# Patient Record
Sex: MALE | Race: WHITE | Employment: FULL TIME | URBAN - METROPOLITAN AREA
[De-identification: names, ages, dates, MRNs, and addresses within clinical notes are randomized per-mention and may not be internally consistent; named-entity substitution may affect disease eponyms.]

---

## 2022-04-11 ENCOUNTER — APPOINTMENT (OUTPATIENT)
Dept: GENERAL RADIOLOGY | Age: 32
DRG: 563 | End: 2022-04-11
Payer: COMMERCIAL

## 2022-04-11 ENCOUNTER — APPOINTMENT (OUTPATIENT)
Dept: CT IMAGING | Age: 32
DRG: 563 | End: 2022-04-11
Payer: COMMERCIAL

## 2022-04-11 ENCOUNTER — HOSPITAL ENCOUNTER (INPATIENT)
Age: 32
LOS: 1 days | Discharge: HOME | DRG: 563 | End: 2022-04-11
Attending: EMERGENCY MEDICINE | Admitting: SURGERY
Payer: COMMERCIAL

## 2022-04-11 VITALS
TEMPERATURE: 98.6 F | DIASTOLIC BLOOD PRESSURE: 110 MMHG | HEIGHT: 73 IN | HEART RATE: 90 BPM | OXYGEN SATURATION: 99 % | RESPIRATION RATE: 21 BRPM | SYSTOLIC BLOOD PRESSURE: 163 MMHG | WEIGHT: 195 LBS | BODY MASS INDEX: 25.84 KG/M2

## 2022-04-11 DIAGNOSIS — M25.571 ACUTE RIGHT ANKLE PAIN: ICD-10-CM
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
## 2022-04-11 LAB
ABO/RH: NORMAL
ANION GAP SERPL CALCULATED.3IONS-SCNC: 14 MMOL/L (ref 9–17)
ANTIBODY SCREEN: NEGATIVE
ARM BAND NUMBER: NORMAL
BLOOD BANK SPECIMEN: ABNORMAL
BUN BLDV-MCNC: 9 MG/DL (ref 6–20)
CARBOXYHEMOGLOBIN: 2.1 % (ref 0–5)
CHLORIDE BLD-SCNC: 105 MMOL/L (ref 98–107)
CO2: 19 MMOL/L (ref 20–31)
CREAT SERPL-MCNC: 0.85 MG/DL (ref 0.7–1.2)
ETHANOL PERCENT: <0.01 %
ETHANOL: <10 MG/DL
EXPIRATION DATE: NORMAL
FIO2: ABNORMAL
GLUCOSE BLD-MCNC: 89 MG/DL (ref 70–99)
HCG QUALITATIVE: ABNORMAL
HCO3 VENOUS: 19.7 MMOL/L (ref 24–30)
HCT VFR BLD CALC: 40.4 % (ref 40.7–50.3)
HEMOGLOBIN: 13.3 G/DL (ref 13–17)
INR BLD: 1
MCH RBC QN AUTO: 27.3 PG (ref 25.2–33.5)
MCHC RBC AUTO-ENTMCNC: 32.9 G/DL (ref 28.4–34.8)
MCV RBC AUTO: 82.8 FL (ref 82.6–102.9)
NEGATIVE BASE EXCESS, VEN: 3.2 MMOL/L (ref 0–2)
NRBC AUTOMATED: 0 PER 100 WBC
O2 SAT, VEN: 76.4 % (ref 60–85)
PARTIAL THROMBOPLASTIN TIME: 23.5 SEC (ref 20.5–30.5)
PATIENT TEMP: 37
PCO2, VEN: 30.6 (ref 39–55)
PDW BLD-RTO: 13.7 % (ref 11.8–14.4)
PH VENOUS: 7.42 (ref 7.32–7.42)
PLATELET # BLD: 255 K/UL (ref 138–453)
PMV BLD AUTO: 11.5 FL (ref 8.1–13.5)
PO2, VEN: 41.5 (ref 30–50)
POTASSIUM SERPL-SCNC: 2.9 MMOL/L (ref 3.7–5.3)
PROTHROMBIN TIME: 11.1 SEC (ref 9.1–12.3)
RBC # BLD: 4.88 M/UL (ref 4.21–5.77)
SARS-COV-2, RAPID: NOT DETECTED
SODIUM BLD-SCNC: 138 MMOL/L (ref 135–144)
SPECIMEN DESCRIPTION: NORMAL
WBC # BLD: 15.7 K/UL (ref 3.5–11.3)

## 2022-04-11 PROCEDURE — 73600 X-RAY EXAM OF ANKLE: CPT

## 2022-04-11 PROCEDURE — 1200000000 HC SEMI PRIVATE

## 2022-04-11 PROCEDURE — 85610 PROTHROMBIN TIME: CPT

## 2022-04-11 PROCEDURE — 86850 RBC ANTIBODY SCREEN: CPT

## 2022-04-11 PROCEDURE — G0480 DRUG TEST DEF 1-7 CLASSES: HCPCS

## 2022-04-11 PROCEDURE — 6360000002 HC RX W HCPCS

## 2022-04-11 PROCEDURE — 73700 CT LOWER EXTREMITY W/O DYE: CPT

## 2022-04-11 PROCEDURE — 86901 BLOOD TYPING SEROLOGIC RH(D): CPT

## 2022-04-11 PROCEDURE — 82805 BLOOD GASES W/O2 SATURATION: CPT

## 2022-04-11 PROCEDURE — 85027 COMPLETE CBC AUTOMATED: CPT

## 2022-04-11 PROCEDURE — 85730 THROMBOPLASTIN TIME PARTIAL: CPT

## 2022-04-11 PROCEDURE — 73610 X-RAY EXAM OF ANKLE: CPT

## 2022-04-11 PROCEDURE — 82947 ASSAY GLUCOSE BLOOD QUANT: CPT

## 2022-04-11 PROCEDURE — 73630 X-RAY EXAM OF FOOT: CPT

## 2022-04-11 PROCEDURE — 99283 EMERGENCY DEPT VISIT LOW MDM: CPT

## 2022-04-11 PROCEDURE — 86900 BLOOD TYPING SEROLOGIC ABO: CPT

## 2022-04-11 PROCEDURE — 87635 SARS-COV-2 COVID-19 AMP PRB: CPT

## 2022-04-11 PROCEDURE — 82565 ASSAY OF CREATININE: CPT

## 2022-04-11 PROCEDURE — 80051 ELECTROLYTE PANEL: CPT

## 2022-04-11 PROCEDURE — 84703 CHORIONIC GONADOTROPIN ASSAY: CPT

## 2022-04-11 PROCEDURE — 96374 THER/PROPH/DIAG INJ IV PUSH: CPT

## 2022-04-11 PROCEDURE — 84520 ASSAY OF UREA NITROGEN: CPT

## 2022-04-11 PROCEDURE — 73590 X-RAY EXAM OF LOWER LEG: CPT

## 2022-04-11 RX ORDER — SODIUM CHLORIDE 9 MG/ML
INJECTION, SOLUTION INTRAVENOUS PRN
Status: CANCELLED | OUTPATIENT
Start: 2022-04-11

## 2022-04-11 RX ORDER — BISACODYL 10 MG
10 SUPPOSITORY, RECTAL RECTAL DAILY
Status: CANCELLED | OUTPATIENT
Start: 2022-04-11

## 2022-04-11 RX ORDER — ONDANSETRON 4 MG/1
4 TABLET, ORALLY DISINTEGRATING ORAL EVERY 8 HOURS PRN
Status: CANCELLED | OUTPATIENT
Start: 2022-04-11

## 2022-04-11 RX ORDER — POLYETHYLENE GLYCOL 3350 17 G/17G
17 POWDER, FOR SOLUTION ORAL DAILY
Status: CANCELLED | OUTPATIENT
Start: 2022-04-11

## 2022-04-11 RX ORDER — IBUPROFEN 800 MG/1
800 TABLET ORAL EVERY 8 HOURS
Qty: 15 TABLET | Refills: 0 | Status: SHIPPED | OUTPATIENT
Start: 2022-04-11 | End: 2022-04-16

## 2022-04-11 RX ORDER — METHOCARBAMOL 500 MG/1
750 TABLET, FILM COATED ORAL EVERY 6 HOURS
Qty: 60 TABLET | Refills: 0 | Status: SHIPPED | OUTPATIENT
Start: 2022-04-11 | End: 2022-04-21

## 2022-04-11 RX ORDER — KETAMINE HYDROCHLORIDE 10 MG/ML
INJECTION, SOLUTION INTRAMUSCULAR; INTRAVENOUS
Status: DISCONTINUED
Start: 2022-04-11 | End: 2022-04-11 | Stop reason: HOSPADM

## 2022-04-11 RX ORDER — ONDANSETRON 2 MG/ML
4 INJECTION INTRAMUSCULAR; INTRAVENOUS EVERY 6 HOURS PRN
Status: CANCELLED | OUTPATIENT
Start: 2022-04-11

## 2022-04-11 RX ORDER — OXYCODONE HYDROCHLORIDE 5 MG/1
5 TABLET ORAL EVERY 6 HOURS PRN
Status: CANCELLED | OUTPATIENT
Start: 2022-04-11

## 2022-04-11 RX ORDER — SODIUM CHLORIDE 0.9 % (FLUSH) 0.9 %
10 SYRINGE (ML) INJECTION PRN
Status: CANCELLED | OUTPATIENT
Start: 2022-04-11

## 2022-04-11 RX ORDER — METHOCARBAMOL 500 MG/1
750 TABLET, FILM COATED ORAL EVERY 6 HOURS
Status: CANCELLED | OUTPATIENT
Start: 2022-04-11

## 2022-04-11 RX ORDER — FENTANYL CITRATE 50 UG/ML
50 INJECTION, SOLUTION INTRAMUSCULAR; INTRAVENOUS ONCE
Status: COMPLETED | OUTPATIENT
Start: 2022-04-11 | End: 2022-04-11

## 2022-04-11 RX ORDER — SODIUM CHLORIDE, SODIUM LACTATE, POTASSIUM CHLORIDE, CALCIUM CHLORIDE 600; 310; 30; 20 MG/100ML; MG/100ML; MG/100ML; MG/100ML
INJECTION, SOLUTION INTRAVENOUS CONTINUOUS
Status: CANCELLED | OUTPATIENT
Start: 2022-04-11

## 2022-04-11 RX ORDER — ACETAMINOPHEN 500 MG
1000 TABLET ORAL EVERY 8 HOURS
Qty: 360 TABLET | Refills: 1 | Status: SHIPPED | OUTPATIENT
Start: 2022-04-11 | End: 2022-04-18

## 2022-04-11 RX ORDER — OXYCODONE HYDROCHLORIDE 5 MG/1
5 TABLET ORAL EVERY 6 HOURS PRN
Qty: 14 TABLET | Refills: 0 | Status: SHIPPED | OUTPATIENT
Start: 2022-04-11 | End: 2022-04-18

## 2022-04-11 RX ORDER — SODIUM CHLORIDE 0.9 % (FLUSH) 0.9 %
5-40 SYRINGE (ML) INJECTION EVERY 12 HOURS SCHEDULED
Status: CANCELLED | OUTPATIENT
Start: 2022-04-11

## 2022-04-11 RX ORDER — ACETAMINOPHEN 500 MG
1000 TABLET ORAL EVERY 8 HOURS SCHEDULED
Status: CANCELLED | OUTPATIENT
Start: 2022-04-11

## 2022-04-11 RX ORDER — GINSENG 100 MG
CAPSULE ORAL 3 TIMES DAILY
Status: CANCELLED | OUTPATIENT
Start: 2022-04-11

## 2022-04-11 RX ORDER — FENTANYL CITRATE 50 UG/ML
INJECTION, SOLUTION INTRAMUSCULAR; INTRAVENOUS
Status: COMPLETED
Start: 2022-04-11 | End: 2022-04-11

## 2022-04-11 RX ADMIN — FENTANYL CITRATE 50 MCG: 50 INJECTION, SOLUTION INTRAMUSCULAR; INTRAVENOUS at 11:58

## 2022-04-11 ASSESSMENT — PAIN DESCRIPTION - FREQUENCY: FREQUENCY: CONTINUOUS

## 2022-04-11 ASSESSMENT — PAIN DESCRIPTION - ORIENTATION: ORIENTATION: RIGHT

## 2022-04-11 ASSESSMENT — PAIN SCALES - GENERAL
PAINLEVEL_OUTOF10: 8
PAINLEVEL_OUTOF10: 8

## 2022-04-11 ASSESSMENT — PAIN DESCRIPTION - PAIN TYPE: TYPE: ACUTE PAIN

## 2022-04-11 ASSESSMENT — PAIN DESCRIPTION - LOCATION: LOCATION: ANKLE;FOOT

## 2022-04-11 NOTE — FLOWSHEET NOTE
[facility] CARE DEPARTMENT - [address]     Emergency/Trauma Note    PATIENT NAME: Ca Trauma Jessee Cee    Shift date: 4/11/2022  Shift day: Monday   Shift # 1    Room # 14/14   Name: Michelet West            Age: 29 y.o. Gender: male          Judaism: None   Place of Bahai:     Trauma/Incident type: Adult Trauma Priority  Admit Date & Time: 4/11/2022 11:12 AM  TRAUMA NAME: Ca Trauma Abraham    ADVANCE DIRECTIVES IN CHART? No    NAME OF DECISION MAKER: Jaylan Vigil, as stated by [facility] TO PATIENT: Mother    PATIENT/EVENT DESCRIPTION:  Michelet West is a 29 y.o. male who arrived via life flight from place of employment in [place]. Patient injured foot. Pt to be admitted to 14/14. SPIRITUAL ASSESSMENT/INTERVENTION:  Assessment: Patient moderately calm initially. He stated that he had already contacted him mom. His girlfriend, Douglas Sosa, works with him and knew he was injured. At follow up, patient was in good spirits; girlfriend had arrived and was at bedside. Both engaged well in conversation and expressed that they had no needs from  at that time. Intervention:  notified registration and front lobby staff.  provided a supportive presence through active listening and words of encouragement. Outcome: Patient and girlfriend expressed their appreciation for support. PATIENT BELONGINGS:   bagged and tagged belongings. ANY BELONGINGS OF SIGNIFICANT VALUE NOTED:  Wallet and phone. REGISTRATION STAFF NOTIFIED? Yes      WHAT IS YOUR SPIRITUAL CARE PLAN FOR THIS PATIENT?:   Chaplains will remain available to offer spiritual and emotional support as needed.     Electronically signed by Diane Valencia on 4/11/2022 at 2:23 PM.  [facility]  [phone]         04/11/22 1422   Encounter Summary   Services provided to: Patient;Significant other   Referral/Consult From: Multi-disciplinary team   Support System Parent;Significant other   Continue Visiting   (4/11/22)   Complexity of Encounter Moderate   Length of Encounter 30 minutes   Crisis   Type Trauma   Assessment Approachable;Coping   Intervention Active listening;Explored feelings, thoughts, concerns   Outcome Expressed gratitude;Engaged in conversation;Expressed feelings/needs/concerns;Receptive

## 2022-04-11 NOTE — H&P
[facility]      TRAUMA HISTORY AND PHYSICAL EXAMINATION    PATIENT NAME: Ca Trauma Chuy Segura ([facility])  YOB: 1878 (1990)  MEDICAL RECORD NO. [id number]   DATE: 4/11/2022  PRIMARY CARE PHYSICIAN: No primary care provider on file. PATIENT EVALUATED AT THE REQUEST OF : Es Palacios    ACTIVATION   []Trauma Alert     [x] Trauma Priority     []Trauma Consult. IMPRESSION:     R foot inj, stuck in [address] [place] [address] Avenue:     Right foot injury  3rd phalanx fracture  2nd cuneform fracture   DP pulses per doppler   Sensation intact   Ortho consult-WBAT   Pain management-MMPT    Okay to discharge to home      Jacobo Sparks 92    [] Neurosurgery     [x] Orthopedic Surgery    [] Cardiothoracic     [] Facial Trauma    [] Plastic Surgery (Burn)    [] Pediatric Surgery     [] Internal Medicine    [] Pulmonary Medicine    [] Other:        HISTORY:     Chief Complaint:  \"L ankle inj\"    GENERAL DATA  Age 82 y.o.  male   Patient information was obtained from patient. History/Exam limitations: none. Patient presented to the Emergency Department by ambulance  Injury Date: 4/11/2022   Approximate Injury Time: PTA        Transport mode:   [x]Ambulance      [] Helicopter     []Car       [] Other  Referring Hospital: [place] [address], (e.g., home, farm, industry, street)  Specific Details of Location (e.g., bedroom, kitchen, garage): work  Type of Residence (if occurred in home setting) (e.g., apartment, mobile home, single family home): work    [facility]    [x] Other L foot in machine at [employer]    [] Other protective devices used / worn ___________________________    HISTORY:     Winnie Alas is a 82 y.o. male that presented to the Emergency Department following getting his right foot stuck in an auger in work. Per reports was in there for approximately 30 minutes.   Patient was given 100 fentanyl prior to arival    Loss of Consciousness [x]No   []Yes Duration(min)       [] Unknown     Total Fluids Given Prior To Arrival unknown mL    MEDICATIONS:   [x]  None     []  Information not available due to exam limitations documented above    ALLERGIES:   [x]  None    []   Information not available due to exam limitations documented above   Patient has no known allergies. PAST MEDICAL HISTORY: []  None   []   Information not available due to exam limitations documented above   History of surgery to left forearm and right hip from previous trauma    FAMILY HISTORY   []   Information not available due to exam limitations documented above  family history includes Hypertension in his maternal grandfather and mother. SOCIAL HISTORY  []   Information not available due to exam limitations documented above   reports that he has been smoking cigarettes. He has been smoking about 1.00 pack per day. He does not have any smokeless tobacco history on file. reports current alcohol use.   has no history on file for drug use.     PERTINENT SYSTEMIC REVIEW:    []   Information not available due to exam limitations documented above    Constitutional: negative  Eyes: negative  Ears, nose, mouth, throat, and face: negative  Respiratory: negative  Cardiovascular: negative  Gastrointestinal: negative  Musculoskeletal:positive for right ankle pain  Neurological: negative    PHYSICAL EXAMINATION:     GLASCOW COMA SCALE  NEUROMUSCULAR BLOCKADE PRIOR TO ARRIVAL     [x]No        []Yes      Variable  Score   Variable  Score  Eye opening [x]Spontaneous 4 Verbal  [x]Oriented  5     []To voice  3   []Confused  4    []To pain  2   []Inapp words  3    []None  1   []Incomp words 2       []None  1   Motor   [x]Obeys  6    []Localizes pain 5    []Withdraws(pain) 4    []Flexion(pain) 3  []Extension(pain) 2    []None  1     GCS Total = 15    PHYSICAL EXAMINATION    VITAL SIGNS:   Vitals:    04/11/22 1138   BP: (!) 155/88   Pulse: 95   Resp: 17   Temp: 98.6 °F (37 °C)   SpO2: 97%        Physical Exam  HENT:      Head: Normocephalic. Right Ear: Hearing normal.      Left Ear: Hearing normal.   Eyes:      Extraocular Movements: Extraocular movements intact. Pupils: Pupils are equal, round, and reactive to light. Cardiovascular:      Rate and Rhythm: Normal rate and regular rhythm. Pulses:           Dorsalis pedis pulses are detected w/ Doppler on the right side. Pulmonary:      Breath sounds: Normal breath sounds. Abdominal:      Palpations: Abdomen is soft. Musculoskeletal:      Cervical back: Full passive range of motion without pain. Right foot: Deformity and tenderness present. Abnormal pulse. Skin:     General: Skin is warm. Findings: Abrasion present. Comments: Abrasions to bilat knees   Neurological:      Mental Status: He is alert. GCS: GCS eye subscore is 4. GCS verbal subscore is 5. GCS motor subscore is 6. Psychiatric:         Attention and Perception: Attention normal.         Mood and Affect: Mood normal.         Behavior: Behavior is cooperative. FOCUSED ABDOMINAL SONOGRAM FOR TRAUMA (FAST): A good  quality examination was performed by GRACY Reece CNP and representative images were obtained. [x] No free fluid in the abdomen   [] Free fluid in RUQ   [] Free fluid in LUQ  [] Free fluid in Pelvis  [] Pericardial fluid  [] Other:        RADIOLOGY  XR ANKLE LEFT (MIN 3 VIEWS)    (Results Pending)   XR ANKLE RIGHT (2 VIEWS)    (Results Pending)         LABS    Labs Reviewed - No data to display      JACKIE Quiroga - CNP  4/11/22, 11:51 AM           Trauma Attending [facility] [address] [facility]      I have reviewed the above GCS note(s) and confirmed the key elements of the medical history and physical exam. I have seen and examined the pt. I have discussed the findings, established the care plan and recommendations with Resident, GCS RN, bedside nurse.         Maria Elena Blackwell DO  4/12/2022  7:55 AM

## 2022-04-11 NOTE — CONSULTS
Orthopedic Surgery Consult  (Dr. Nas Humphries)      CC/Reason for consult: Right ankle pain    HPI:      The patient is a 29 y.o. male who was at work at a [employer] company when he accidentally stepped backwards on to the conveyor getting his right foot caught. Patient reports that he was stuck for approximately 45 minutes until the fire department was able to extricate his foot. Patient reports feeling and hearing a \"pop\" in his ankle. Patient has not had to ambulate since the event. Patient denies any other injuries or complaints. Patient denies loss of consciousness or head trauma. Patient reports mild tingling on the sole of the right foot otherwise no weakness or numbness. Patient arrived to [facility] via EMS. No [facility], [facility]     Prior orthopedic injuries/surgeries: Left forearm both bone fracture ORIF and pelvis ORIF '2014    No Medical history    Past Medical History  Ca Trauma Maribeth Barfield has no past medical history on file. Past Surgical History  Ca Trauma Abraham has no past surgical history on file. Current Medications  Reviewed. See EMR for details. Allergies  Allergies reviewed: Patient has no known allergies. Social History  Patient reports that he has been smoking cigarettes. He has been smoking about 1.00 pack per day. He does not have any smokeless tobacco history on file. He reports current alcohol use. Family History  Patient family history includes Hypertension in his maternal grandfather and mother. ROS:   General: Afebrile, no chills  CV: No chest pain. Resp: No SOB. MSK: Right ankle pain  Neuro: No numbness, tingling, weakness  10 point ROS negative other than stated above    PE:  Blood pressure (!) 150/91, pulse 95, temperature 98.6 °F (37 °C), temperature source Oral, resp. rate 18, height 6' 1\" (1.854 m), weight 195 lb (88.5 kg), SpO2 98 %. Gen: Alert and oriented, NAD, cooperative. Head: Normocephalic, atraumatic.     Cardiovascular: Regular rate. Respiratory: Chest symmetric, no accessory muscle use. RLE: Skin intact with scattered abrasions on the dorsum of foot and toes. No ecchymoses or lacerations. Foot is slightly plantar flexed and inverted, no obvious subluxation. Diffusely tender to palpation over the foot with most prominent over lateral malleolus. No crepitus. Compartments soft and easily compressible. EHL/FHL/TA/GS complex motor intact. Sural/saphenous/SPN/DPN/plantar nerve distribution SILT though slight tingling on plantar distribution. Increased laxity on anterior drawer of ankle (grade 1) compared with contralateral. Stable to external rotation stress. (-) log roll. Able to perform straight leg raise. Knee ligaments grossly intact. DP/PT pulses 2+ with BCR. Labs  Recent Labs     04/11/22  1158   WBC 15.7*   HGB 13.3   HCT 40.4*      INR 1.0      K 2.9*   BUN 9   CREATININE 0.85   GLUCOSE 89        Imaging   4 views of the right ankle demonstrate no acute or chronic fractures. Negative stress view appreciated. Anatomic alignment maintained with position of talus within the mortise. On lateral view, the talus is subluxed slightly anterior possibility due to plantarflexion of the foot. 3 views of the right foot demonstrate no acute Fractures with bony alignment maintained on AP, lateral, and oblique views. Soft tissue swelling appreciated. No other abnormalities noted this time.      Assessment/Plan: 29 y.o. male who got his right foot stuck in a conveyor belt at work, being seen for:    -Right ankle, rule out ligamentous injury    -No acute orthopedic intervention  -Placed in boot  -WBAT RLE  -Maintain boot for comfort and with ambulation  -Adult diet okay from orthopedic perspective  -Medical management per primary  -Daily dressing change per nursing  -Ice for pain and swelling  -PT/OT eval and treat  -Okay for discharge from orthopedic respective  -F/u follow-up with Dr. Curtis Perry in 7 to 10 days  -Please contact ortho with any questions    Esau Nogueira DO  Orthopedic Surgery Resident  12:48 PM 4/11/2022    PGY-2 Addendum    Patient seen and examined. Agree with Dr. Jada Flores history, physical examination, assessment and plan except where changes were made above (may be highlighted by Terry Semen selection feature). CT findings did demonstrate some middle phalanges fxs of the 2nd and 3rd digits of the right foot as well as the middle cuneiform, but these were only apparent on sagittal imaging, and patient is minimally tender at the foot. In light of these findings, we will allow him to [facility] in his boot until follow up in clinic. All questions from the patient were answered.     Ashlyn Jeff MD, PGY-2  Orthopedic Surgery Resident  [place] [address], [facility]

## 2022-04-11 NOTE — ED PROVIDER NOTES
[facility]     Emergency Department     Faculty Attestation    I performed a history and physical examination of the patient and discussed management with the resident. I reviewed the residents note and agree with the documented findings and plan of care. Any areas of disagreement are noted on the chart. I was personally present for the key portions of any procedures. I have documented in the chart those procedures where I was not present during the key portions. I have reviewed the emergency nurses triage note. I agree with the chief complaint, past medical history, past surgical history, allergies, medications, social and family history as documented unless otherwise noted below. For Physician Assistant/ Nurse Practitioner cases/documentation I have personally evaluated this patient and have completed at least one if not all key elements of the E/M (history, physical exam, and MDM). Additional findings are as noted. I have personally seen and evaluated the patient. I find the patient's history and physical exam are consistent with the NP/PA documentation. I agree with the care provided, treatment rendered, disposition and follow-up plan. Injured right ankle an operative day no other complaints airways intact at the present time      Critical Care     Chapincito Dukes M.D.   Attending Emergency  Physician              Jessica Newman MD  04/11/22 1121

## 2022-04-11 NOTE — ED PROVIDER NOTES
[facility] ED  Emergency Department Encounter  Emergency Medicine Resident     Pt Name: Zurdo Valadez  MRN: [id number]  Zacharygfsherri 1990  Date of evaluation: 4/11/22  PCP:  No primary care provider on file. CHIEF COMPLAINT       Chief Complaint   Patient presents with    Ankle Injury     right foot stuck in machine, 1 hr extraction, no open fx, obvious closed fx       HISTORY OFPRESENT ILLNESS  (Location/Symptom, Timing/Onset, Context/Setting, Quality, Duration, Modifying Factors,Severity.)      Zurdo Valadez is a [address] [place] y.o. male who presents as a trauma after his foot was in a conveyor belt at work. Extrication time was approximately 1 hour. He arrives to the emergency department with obvious right ankle deformity and overlying bruising and abrasions. Pulses are palpable. No airway or breathing issues. No other injuries involved. Patient denies any other past medical history. He does not take any medications and has no allergies. PAST MEDICAL / SURGICAL / SOCIAL / FAMILY HISTORY      has no past medical history on file. has no past surgical history on file.      Social History     Socioeconomic History    Marital status: Single     Spouse name: Not on file    Number of children: Not on file    Years of education: Not on file    Highest education level: Not on file   Occupational History    Not on file   Tobacco Use    Smoking status: Current Every Day Smoker     Packs/day: 1.00     Types: Cigarettes    Smokeless tobacco: Not on file   Substance and Sexual Activity    Alcohol use: Yes     Comment: weekends    Drug use: Not on file    Sexual activity: Not on file   Other Topics Concern    Not on file   Social History Narrative    Not on file     Social Determinants of Health     Financial Resource Strain:     Difficulty of Paying Living Expenses: Not on file   Food Insecurity:     Worried About Running Out of Food in the Last Year: Not on file    Angel of Food in the Last Year: Not on file   Transportation Needs:     Lack of Transportation (Medical): Not on file    Lack of Transportation (Non-Medical): Not on file   Physical Activity:     Days of Exercise per Week: Not on file    Minutes of Exercise per Session: Not on file   Stress:     Feeling of Stress : Not on file   Social Connections:     Frequency of Communication with Friends and Family: Not on file    Frequency of Social Gatherings with Friends and Family: Not on file    Attends Congregation Services: Not on file    Active Member of [address] [place] [address] [employer] or Organizations: Not on file    Attends Club or Organization Meetings: Not on file    Marital Status: Not on file   Intimate Partner Violence:     Fear of Current or Ex-Partner: Not on file    Emotionally Abused: Not on file    Physically Abused: Not on file    Sexually Abused: Not on file   Housing Stability:     Unable to Pay for Housing in the Last Year: Not on file    Number of [place] in the Last Year: Not on file    Unstable Housing in the Last Year: Not on file       Family History   Problem Relation Age of Onset    Hypertension Mother     Hypertension Maternal Grandfather         Allergies:  Patient has no known allergies. Home Medications:  Prior to Admission medications    Medication Sig Start Date End Date Taking? Authorizing Provider   oxyCODONE (ROXICODONE) 5 MG immediate release tablet Take 1 tablet by mouth every 6 hours as needed for Pain for up to 7 days. Intended supply: 7 days.  Take lowest dose possible to manage pain 4/11/22 4/18/22 Yes Juan Aguilarills, APRN - CNP   methocarbamol (ROBAXIN) 500 MG tablet Take 1.5 tablets by mouth every 6 hours for 10 days 4/11/22 4/21/22 Yes Juan Grills, APRN - CNP   acetaminophen (TYLENOL) 500 MG tablet Take 2 tablets by mouth every 8 hours for 7 days 4/11/22 4/18/22 Yes Ruther Grills, APRN - CNP   ibuprofen (ADVIL;MOTRIN) 800 MG tablet Take 1 tablet by mouth every 8 hours for 5 days 4/11/22 4/16/22 Yes Annamarie Calixto, APRN - CNP       REVIEW OFSYSTEMS    (2-9 systems for level 4, 10 or more for level 5)      Review of Systems   Constitutional: Negative for chills and fever. HENT: Negative for congestion and rhinorrhea. Eyes: Negative for visual disturbance. Respiratory: Negative for cough and shortness of breath. Cardiovascular: Negative for chest pain. Gastrointestinal: Negative for abdominal pain, constipation, diarrhea, nausea and vomiting. Genitourinary: Negative for scrotal swelling. Musculoskeletal: Positive for arthralgias and joint swelling. Negative for back pain and neck pain. Skin: Negative for rash. Neurological: Negative for weakness, numbness and headaches. PHYSICAL EXAM   (up to 7 for level 4, 8 or more forlevel 5)      INITIAL VITALS:   ED Triage Vitals   BP Temp Temp Source Pulse Resp SpO2 Height Weight   04/11/22 1123 04/11/22 1128 04/11/22 1128 04/11/22 1123 04/11/22 1128 04/11/22 1123 04/11/22 1128 04/11/22 1128   124/71 98.5 °F (36.9 °C) Oral 104 29 100 % 6' 1\" (1.854 m) 195 lb (88.5 kg)       Physical Exam  Constitutional:       Appearance: Normal appearance. He is not ill-appearing, toxic-appearing or diaphoretic. Comments: In moderate distress due to pain. HENT:      Head: Normocephalic and atraumatic. Mouth/Throat:      Mouth: Mucous membranes are moist.      Pharynx: Oropharynx is clear. Eyes:      Extraocular Movements: Extraocular movements intact. Cardiovascular:      Rate and Rhythm: Normal rate and regular rhythm. Heart sounds: Normal heart sounds. No murmur heard. Pulmonary:      Effort: Pulmonary effort is normal. No respiratory distress. Breath sounds: Normal breath sounds. No wheezing or rhonchi. Abdominal:      Palpations: Abdomen is soft. Tenderness: There is no abdominal tenderness. Musculoskeletal:         General: Normal range of motion.       Cervical back: Normal range of motion and neck supple. Comments: Obvious deformity to the right ankle with overlying abrasions. Still able to wiggle toes. No other obvious injuries. Skin:     General: Skin is warm and dry. Comments: Full abrasions and bruising over the right foot and lower leg. Neurological:      General: No focal deficit present. Mental Status: He is alert and oriented to person, place, and time. Cranial Nerves: No cranial nerve deficit. Sensory: No sensory deficit. Motor: No weakness. DIFFERENTIAL  DIAGNOSIS     PLAN (LABS / IMAGING / EKG):  Orders Placed This Encounter   Procedures    COVID-19, Rapid    XR ANKLE RIGHT (2 VIEWS)    XR FOOT RIGHT (MIN 3 VIEWS)    XR TIBIA FIBULA RIGHT (2 VIEWS)    XR ANKLE RIGHT (MIN 3 VIEWS)    CT ANKLE RIGHT WO CONTRAST    CT FOOT RIGHT WO CONTRAST    Trauma Panel    Inpatient consult to Orthopedic Surgery    Type and Screen    ADMIT TO INPATIENT    Discharge patient       MEDICATIONS ORDERED:  Orders Placed This Encounter   Medications    DISCONTD: ketamine (KETALAR) 10 MG/ML injection     Sarah Salazar: cabinet override    fentaNYL (SUBLIMAZE) injection 50 mcg    fentaNYL (SUBLIMAZE) 100 MCG/2ML injection     Kimberly Andres: cabinet override    oxyCODONE (ROXICODONE) 5 MG immediate release tablet     Sig: Take 1 tablet by mouth every 6 hours as needed for Pain for up to 7 days. Intended supply: 7 days.  Take lowest dose possible to manage pain     Dispense:  14 tablet     Refill:  0     Reduce doses taken as pain becomes manageable    methocarbamol (ROBAXIN) 500 MG tablet     Sig: Take 1.5 tablets by mouth every 6 hours for 10 days     Dispense:  60 tablet     Refill:  0    acetaminophen (TYLENOL) 500 MG tablet     Sig: Take 2 tablets by mouth every 8 hours for 7 days     Dispense:  360 tablet     Refill:  1    ibuprofen (ADVIL;MOTRIN) 800 MG tablet     Sig: Take 1 tablet by mouth every 8 hours for 5 days     Dispense:  15 tablet Refill:  0     Initial MDM/Plan/ED COURSE:    29 y.o. male who presents as a trauma after his right foot was stuck in a conveyor belt at work. Initial exam, patient is in moderate distress due to pain but otherwise mentating appropriately, without any airway or breathing difficulties. Pulses are palpable in the right lower extremity with obvious deformity at the right ankle. After initial trauma examination including secondary survey with no other injuries identified, we prepared for possible sedation for reduction of right ankle prior to CT scans. Prior to giving sedative medication, adequate alignment of the ankle was achieved with slow traction. Further imaging was done including CTs. This demonstrated middle phalangeal fractures of the second and third digits of the right foot, middle cuneiform, and mild swelling around the ankle joint. Patient did not have significant tenderness overlying these fractures and orthopedic surgery recommended weightbearing as tolerated in boot. He will follow up with orthopedic surgery. Patient discharged from the emergency department.      :     DIAGNOSTIC RESULTS / [facility] / [facility]     LABS:  Labs Reviewed   TRAUMA PANEL - Abnormal; Notable for the following components:       Result Value    WBC 15.7 (*)     Hematocrit 40.4 (*)     Potassium 2.9 (*)     CO2 19 (*)     pH, Rocael 7.425 (*)     pCO2, Rocael 30.6 (*)     HCO3, Venous 19.7 (*)     Negative Base Excess, Rocael 3.2 (*)     All other components within normal limits   COVID-19, RAPID   TYPE AND SCREEN     XR TIBIA FIBULA RIGHT (2 VIEWS)    Result Date: 4/11/2022  EXAMINATION: XRAY VIEWS OF THE RIGHT TIBIA AND FIBULA; THREE XRAY VIEWS OF THE RIGHT FOOT 4/11/2022 12:34 pm COMPARISON: None. HISTORY: ORDERING SYSTEM PROVIDED HISTORY: Trauma/Fracture TECHNOLOGIST PROVIDED HISTORY: Trauma/Fracture FINDINGS: Fiberglass casting material is seen about the ankle.   No acute fracture or dislocation     No acute fracture or dislocation     XR ANKLE RIGHT (2 VIEWS)    Result Date: 4/11/2022  EXAMINATION: 2 XRAY VIEWS OF THE RIGHT ANKLE 4/11/2022 11:48 am COMPARISON: None. HISTORY: ORDERING SYSTEM PROVIDED HISTORY: trauma TECHNOLOGIST PROVIDED HISTORY: trauma Male involved in a trauma; right ankle pain FINDINGS: Mild soft tissue edema of the right ankle. Osseous alignment is normal.  No acute fracture or dislocation. Ankle mortise appears intact. Joint spaces relatively well maintained. No tibiotalar joint effusion. 1. Mild soft tissue edema about the right ankle. 2. No acute fracture or dislocation. XR ANKLE RIGHT (MIN 3 VIEWS)    Result Date: 4/11/2022  EXAMINATION: THREE XRAY VIEWS OF THE RIGHT ANKLE 4/11/2022 11:46 am COMPARISON: None. HISTORY: ORDERING SYSTEM PROVIDED HISTORY: Trauma/Fracture TECHNOLOGIST PROVIDED HISTORY: Trauma/Fracture FINDINGS: No acute fracture or malalignment. No significant degenerative changes. Soft tissues unremarkable. 1. No acute fracture or malalignment. XR FOOT RIGHT (MIN 3 VIEWS)    Result Date: 4/11/2022  EXAMINATION: XRAY VIEWS OF THE RIGHT TIBIA AND FIBULA; THREE XRAY VIEWS OF THE RIGHT FOOT 4/11/2022 12:34 pm COMPARISON: None. HISTORY: ORDERING SYSTEM PROVIDED HISTORY: Trauma/Fracture TECHNOLOGIST PROVIDED HISTORY: Trauma/Fracture FINDINGS: Fiberglass casting material is seen about the ankle. No acute fracture or dislocation     No acute fracture or dislocation     CT ANKLE RIGHT WO CONTRAST    Result Date: 4/11/2022  EXAMINATION: CT OF THE RIGHT ANKLE WITHOUT CONTRAST; CT OF THE RIGHT FOOT WITHOUT CONTRAST 4/11/2022 1:02 pm TECHNIQUE: CT of the right ankle was performed without the administration of intravenous contrast.  Multiplanar reformatted images are provided for review.  Dose modulation, iterative reconstruction, and/or weight based adjustment of the mA/kV was utilized to reduce the radiation dose to as low as reasonably achievable.; CT of the right foot was performed without the administration of intravenous contrast.  Multiplanar reformatted images are provided for review. Dose modulation, iterative reconstruction, and/or weight based adjustment of the mA/kV was utilized to reduce the radiation dose to as low as reasonably achievable. COMPARISON: Right tibia/fibula, right ankle and right foot x-rays from earlier today. HISTORY ORDERING SYSTEM PROVIDED HISTORY: r/o fracture TECHNOLOGIST PROVIDED HISTORY: r/o fracture Reason for Exam: r/o fracture FINDINGS: CT of the right foot and CT of the right ankle are provided. Studies are interpreted in conjunction with one another. Bones: Small acute traumatic obliquely oriented intra-articular fracture involving plantar base of 2nd middle phalanx. Acute traumatic comminuted displaced intra-articular fracture involving base of 3rd middle phalanx. Small acute traumatic nondisplaced fracture involving plantar aspect of 2nd cuneiform only well appreciated on axial images (reference for example image 98, axial sequence, series 2 of ankle exam). No additional fractures are seen. Bony alignment appears anatomic. No suspicious focal bony lesions are seen. Soft Tissue: There is mild subcutaneous edema about the ankle and to the dorsal foot. No focal fluid collections, radiopaque foreign bodies or soft tissue gas. To the extent that they can be evaluated the visualized tendons appear to be grossly intact and appropriately located. Joint: No significant degenerative changes. No osseous erosions. No large joint effusions. CT of the right foot and CT of the right ankle demonstrate small acute traumatic obliquely oriented intra-articular fracture involving plantar base of 2nd middle phalanx. There is also acute traumatic comminuted displaced intra-articular fracture involving base of 3rd middle phalanx. There is a small acute traumatic nondisplaced fracture involving the plantar aspect of the 2nd cuneiform.  Bony alignment appears to be anatomic. Mild subcutaneous edema about the ankle and to the dorsal foot. CT FOOT RIGHT WO CONTRAST    Result Date: 4/11/2022  EXAMINATION: CT OF THE RIGHT ANKLE WITHOUT CONTRAST; CT OF THE RIGHT FOOT WITHOUT CONTRAST 4/11/2022 1:02 pm TECHNIQUE: CT of the right ankle was performed without the administration of intravenous contrast.  Multiplanar reformatted images are provided for review. Dose modulation, iterative reconstruction, and/or weight based adjustment of the mA/kV was utilized to reduce the radiation dose to as low as reasonably achievable.; CT of the right foot was performed without the administration of intravenous contrast.  Multiplanar reformatted images are provided for review. Dose modulation, iterative reconstruction, and/or weight based adjustment of the mA/kV was utilized to reduce the radiation dose to as low as reasonably achievable. COMPARISON: Right tibia/fibula, right ankle and right foot x-rays from earlier today. HISTORY ORDERING SYSTEM PROVIDED HISTORY: r/o fracture TECHNOLOGIST PROVIDED HISTORY: r/o fracture Reason for Exam: r/o fracture FINDINGS: CT of the right foot and CT of the right ankle are provided. Studies are interpreted in conjunction with one another. Bones: Small acute traumatic obliquely oriented intra-articular fracture involving plantar base of 2nd middle phalanx. Acute traumatic comminuted displaced intra-articular fracture involving base of 3rd middle phalanx. Small acute traumatic nondisplaced fracture involving plantar aspect of 2nd cuneiform only well appreciated on axial images (reference for example image 98, axial sequence, series 2 of ankle exam). No additional fractures are seen. Bony alignment appears anatomic. No suspicious focal bony lesions are seen. Soft Tissue: There is mild subcutaneous edema about the ankle and to the dorsal foot. No focal fluid collections, radiopaque foreign bodies or soft tissue gas.   To the extent that they can be evaluated the visualized tendons appear to be grossly intact and appropriately located. Joint: No significant degenerative changes. No osseous erosions. No large joint effusions. CT of the right foot and CT of the right ankle demonstrate small acute traumatic obliquely oriented intra-articular fracture involving plantar base of 2nd middle phalanx. There is also acute traumatic comminuted displaced intra-articular fracture involving base of 3rd middle phalanx. There is a small acute traumatic nondisplaced fracture involving the plantar aspect of the 2nd cuneiform. Bony alignment appears to be anatomic. Mild subcutaneous edema about the ankle and to the dorsal foot. No results found. EKG      All EKG's are interpreted by the Emergency Department Physicianwho either signs or Co-signs this chart in the absence of a cardiologist.      PROCEDURES:  None    CONSULTS:  IP CONSULT TO ORTHOPEDIC SURGERY    CRITICAL CARE:  Please see attending note    FINAL IMPRESSION      1. Work related injury    2. Acute right ankle pain          DISPOSITION / PLAN     DISPOSITION Decision To Discharge 04/11/2022 03:42:12 PM      PATIENT REFERRED TO:  Tere Prieto DO  [address] [place] [address]  [phone]    Schedule an appointment as soon as possible for a visit in 1 week        DISCHARGE MEDICATIONS:  Discharge Medication List as of 4/11/2022  3:30 PM      START taking these medications    Details   oxyCODONE (ROXICODONE) 5 MG immediate release tablet Take 1 tablet by mouth every 6 hours as needed for Pain for up to 7 days. Intended supply: 7 days.  Take lowest dose possible to manage pain, Disp-14 tablet, R-0Normal      methocarbamol (ROBAXIN) 500 MG tablet Take 1.5 tablets by mouth every 6 hours for 10 days, Disp-60 tablet, R-0Normal      acetaminophen (TYLENOL) 500 MG tablet Take 2 tablets by mouth every 8 hours for 7 days, Disp-360 tablet, R-1Normal      ibuprofen (ADVIL;MOTRIN) 800 MG tablet Take 1 tablet by mouth every 8 hours for 5 days, Disp-15 tablet, R-0Normal             Jailene Mejia DO  Emergency Medicine Resident    (Please note that portions of this note were completed with a voice recognition program.Efforts were made to edit the dictations but occasionally words are mis-transcribed.)        Jailene Mejia DO  Resident  04/13/22 [id number]

## 2022-04-12 ENCOUNTER — CARE COORDINATION (OUTPATIENT)
Dept: CASE MANAGEMENT | Age: 32
End: 2022-04-12

## 2022-04-12 NOTE — CARE COORDINATION
[facility] Transitions Initial Follow Up Call    Call within 2 business days of discharge: Yes    Patient: Jacob Torrez Patient : 1990   MRN: <[id number]>  Reason for Admission: work related injury  Discharge Date: 22 RARS: Readmission Risk Score: 4.3 ( )      Last Discharge [facility]       Complaint Diagnosis Description Type Department Provider    22 Ankle Injury Work related injury . .. ED (DISCHARGE) [facility] MABEL Moreno. .. First attempt at initial 24 hour CTN call     Spoke with:  Called to speak with patient for initial  transition of care. No answer on only phone on file , phone does not have voicemail set up . CTN will attempt to reach patient at later time. Facility: [facility]    Non-face-to-face services provided:  Obtained and reviewed discharge summary and/or continuity of care documents    Care Transitions 24 Hour Call    Care Transitions Interventions         Follow Up  No future appointments.     Og Trevino LPN

## 2022-04-13 ENCOUNTER — CARE COORDINATION (OUTPATIENT)
Dept: CASE MANAGEMENT | Age: 32
End: 2022-04-13

## 2022-04-13 ASSESSMENT — ENCOUNTER SYMPTOMS
NAUSEA: 0
CONSTIPATION: 0
ABDOMINAL PAIN: 0
RHINORRHEA: 0
VOMITING: 0
DIARRHEA: 0
BACK PAIN: 0
SHORTNESS OF BREATH: 0
COUGH: 0

## 2022-04-13 NOTE — CARE COORDINATION
[facility] Transitions Initial Follow Up Call    Call within 2 business days of discharge: Yes    Patient: Paulino Quintana Patient : 1990   MRN: <[id number]>  Reason for Admission:  Work related injury  Discharge Date: 22 RARS: Readmission Risk Score: 4.3 ( )      Last Discharge [facility]       Complaint Diagnosis Description Type Department Provider    22 Ankle Injury Work related injury . .. ED (DISCHARGE) [facility] ED Karen Carmen DO; Samuel White. .. Second attempt at initial 24 hour CTN call     Spoke with:  Called to speak with patient for initial  transition of care. No answer and patient does not have a voicemail to leave callback information on. Facility: [facility]    Non-face-to-face services provided:  Obtained and reviewed discharge summary and/or continuity of care documents    Care Transitions 24 Hour Call    Care Transitions Interventions         Follow Up  No future appointments.     Crystal Hicks LPN

## 2022-04-18 ENCOUNTER — OFFICE VISIT (OUTPATIENT)
Dept: ORTHOPEDIC SURGERY | Age: 32
End: 2022-04-18
Payer: COMMERCIAL

## 2022-04-18 VITALS — WEIGHT: 195 LBS | BODY MASS INDEX: 25.84 KG/M2 | HEIGHT: 73 IN

## 2022-04-18 DIAGNOSIS — S99.921A INJURY OF RIGHT FOOT, INITIAL ENCOUNTER: Primary | ICD-10-CM

## 2022-04-18 PROCEDURE — 99214 OFFICE O/P EST MOD 30 MIN: CPT | Performed by: PHYSICIAN ASSISTANT

## 2022-04-18 RX ORDER — CEPHALEXIN 500 MG/1
500 CAPSULE ORAL 4 TIMES DAILY
Qty: 28 CAPSULE | Refills: 0 | Status: SHIPPED | OUTPATIENT
Start: 2022-04-18

## 2022-04-18 ASSESSMENT — ENCOUNTER SYMPTOMS
COLOR CHANGE: 0
VOMITING: 0
COUGH: 0
SHORTNESS OF BREATH: 0

## 2022-04-18 NOTE — LETTER
[facility]  [address]  Dept: [phone]  Dept Fax: [phone]  4/18/22    Patient: Susan Javed  YOB: 1990        PLAN:  1.) Begin antibiotic. Keflex 500mg every 6 hours (4 time per day) for 7 days. Sent to [facility] in [place], [place].    2.) Dressing change daily with Betadine paint around wounds one time daily (Betadine Swabs given). 3.) Call Occupation Health at [facility] to establish a claim number and make an appointment. 4.) Call Orthopedics at [facility] to see if they are accepting Workers Comp Patients. Transition if applicable. 5.) Continue to wear walking boot. Weight bear as tolerated on right foot. May return to work doing seated work only. 6.) Follow up in 1 week for a wound check.                Jennyfer Kong PA-C

## 2022-04-18 NOTE — PROGRESS NOTES
[address] [facility]  [address] [facility] [address]  Dept: [phone]    Ambulatory Orthopedic New Patient Visit/ Geovanny Fitzgerald      CHIEF COMPLAINT:    Chief Complaint   Patient presents with    Ankle Injury     WC injury; right ankle/foot pain       HISTORY OF PRESENT ILLNESS:      Date of Injury: 4/11/2022  Geovanny Fitzgerald Claim #: No claim open yet    The patient is a 29 y.o. male who is being seen  for consultation and evaluation of right foot/ankle injury sustained on 4/11/2022. Patient notes that he was working at [employer] in [place] on 4/11/2022 and he stepped down off a platform and got his right foot caught in a conveyor belt. Patient notes that he was stuck in the machine for approximately 45 minutes and EMS needed to extract him from the device. The patient was life lighted to [facility] emergency department where he had imaging completed. The patient was found to have a second and third toe fracture with associated cuboid fracture seen on CT scan only. Patient was consulted by orthopedics and was placed in a Cam walking boot on the right lower extremity. He was instructed to change his dressings on the right foot daily due to open wounds. He notes that he was given an updated tetanus shot but was not given oral antibiotics. He notes that he is taking Roxicodone for injury pain control. Patient lives in [address] [place] [address] and states that he would like to transfer care to [facility] if possible. He notes that he has not been to occupational health yet. He denies numbness and or tingling in the right lower extremity. He notes that he has been able to weight-bear while wearing the cam walking boot on the right lower extremity. He denies numbness and or tingling in the right lower extremity. Past Medical History:    No past medical history on file.     Past Surgical History:    No past surgical history on file. Current Medications:   Current Outpatient Medications   Medication Sig Dispense Refill    cephALEXin (KEFLEX) 500 MG capsule Take 1 capsule by mouth 4 times daily 28 capsule 0    oxyCODONE (ROXICODONE) 5 MG immediate release tablet Take 1 tablet by mouth every 6 hours as needed for Pain for up to 7 days. Intended supply: 7 days. Take lowest dose possible to manage pain 14 tablet 0    methocarbamol (ROBAXIN) 500 MG tablet Take 1.5 tablets by mouth every 6 hours for 10 days 60 tablet 0    acetaminophen (TYLENOL) 500 MG tablet Take 2 tablets by mouth every 8 hours for 7 days 360 tablet 1    ibuprofen (ADVIL;MOTRIN) 800 MG tablet Take 1 tablet by mouth every 8 hours for 5 days 15 tablet 0     No current facility-administered medications for this visit. Allergies:    Patient has no known allergies. Social History:   Social History     Socioeconomic History    Marital status: Single     Spouse name: Not on file    Number of children: Not on file    Years of education: Not on file    Highest education level: Not on file   Occupational History    Not on file   Tobacco Use    Smoking status: Current Every Day Smoker     Packs/day: 1.00     Types: Cigarettes    Smokeless tobacco: Never Used   Substance and Sexual Activity    Alcohol use: Yes     Comment: weekends    Drug use: Not on file    Sexual activity: Not on file   Other Topics Concern    Not on file   Social History Narrative    Not on file     Social Determinants of Health     Financial Resource Strain:     Difficulty of Paying Living Expenses: Not on file   Food Insecurity:     Worried About Running Out of Food in the Last Year: Not on file    Angel of Food in the Last Year: Not on file   Transportation Needs:     Lack of Transportation (Medical): Not on file    Lack of Transportation (Non-Medical):  Not on file   Physical Activity:     Days of Exercise per Week: Not on file    Minutes of Exercise per Session: Not on file   Stress:     Feeling of Stress : Not on file   Social Connections:     Frequency of Communication with Friends and Family: Not on file    Frequency of Social Gatherings with Friends and Family: Not on file    Attends [employer]ist Services: Not on file    Active Member of [address] [place] [address] [email or web address] or Organizations: Not on file    Attends Club or Organization Meetings: Not on file    Marital Status: Not on file   Intimate Partner Violence:     Fear of Current or Ex-Partner: Not on file    Emotionally Abused: Not on file    Physically Abused: Not on file    Sexually Abused: Not on file   Housing Stability:     Unable to Pay for Housing in the Last Year: Not on file    Number of [place] in the Last Year: Not on file    Unstable Housing in the Last Year: Not on file       Family History:  Family History   Problem Relation Age of Onset    Hypertension Mother     Hypertension Maternal Grandfather          REVIEW OF SYSTEMS:  Review of Systems   Constitutional: Negative for activity change and fever. HENT: Negative for sneezing. Respiratory: Negative for cough and shortness of breath. Cardiovascular: Negative for chest pain. Gastrointestinal: Negative for vomiting. Musculoskeletal: Positive for arthralgias (right foot) and joint swelling (righ foot). Negative for myalgias. Skin: Negative for color change. Neurological: Negative for weakness and numbness. Psychiatric/Behavioral: Negative for sleep disturbance. PHYSICAL EXAM:  Ht 6' 1\" (1.854 m)   Wt 195 lb (88.5 kg)   BMI 25.73 kg/m²  Body mass index is 25.73 kg/m². Physical Exam  Gen: alert and oriented  Psych:  Appropriate affect; Appropriate knowledge base; Appropriate mood; No hallucinations; Head: normocephalic, atraumatic   Chest: symmetric chest excursion  Pelvis: stable with ambulation  Ortho Exam  Extremity: Patient arrived in a Cam walking boot.   After removal of the boot evaluation of the right foot and ankle reveals mild diffuse swelling on the dorsum of the right foot. There are multiple healing abrasions noted without drainage or gross signs of infection noted. There is mild induration surrounding the abrasions on the dorsum of the right foot and on the medial aspect of the right ankle. No palpable fluctuance appreciated. Healing ecchymosis noted right foot and ankle. Patient is able to wiggle all 5 toes without discomfort. Mild discomfort noted over the talus on the dorsum of the right foot and into the medial aspect of the right first meta tarsal.  Patient has nearly full range of motion of the right ankle in plantarflexion and dorsiflexion. He does note some tightness and soreness with inversion and eversion of the ankle. Full range of motion of the right knee is appreciated. No calf tenderness appreciated with palpation. Motor, sensory, vascular examination to the right lower extremity is grossly intact without focal deficits. Radiology:    CT ANKLE RIGHT WO CONTRAST    Result Date: 4/11/2022  EXAMINATION: CT OF THE RIGHT ANKLE WITHOUT CONTRAST; CT OF THE RIGHT FOOT WITHOUT CONTRAST 4/11/2022 1:02 pm TECHNIQUE: CT of the right ankle was performed without the administration of intravenous contrast.  Multiplanar reformatted images are provided for review. Dose modulation, iterative reconstruction, and/or weight based adjustment of the mA/kV was utilized to reduce the radiation dose to as low as reasonably achievable.; CT of the right foot was performed without the administration of intravenous contrast.  Multiplanar reformatted images are provided for review. Dose modulation, iterative reconstruction, and/or weight based adjustment of the mA/kV was utilized to reduce the radiation dose to as low as reasonably achievable. COMPARISON: Right tibia/fibula, right ankle and right foot x-rays from earlier today.  HISTORY ORDERING SYSTEM PROVIDED HISTORY: r/o fracture TECHNOLOGIST PROVIDED HISTORY: r/o fracture Reason for Exam: r/o fracture FINDINGS: CT of the right foot and CT of the right ankle are provided. Studies are interpreted in conjunction with one another. Bones: Small acute traumatic obliquely oriented intra-articular fracture involving plantar base of 2nd middle phalanx. Acute traumatic comminuted displaced intra-articular fracture involving base of 3rd middle phalanx. Small acute traumatic nondisplaced fracture involving plantar aspect of 2nd cuneiform only well appreciated on axial images (reference for example image 98, axial sequence, series 2 of ankle exam). No additional fractures are seen. Bony alignment appears anatomic. No suspicious focal bony lesions are seen. Soft Tissue: There is mild subcutaneous edema about the ankle and to the dorsal foot. No focal fluid collections, radiopaque foreign bodies or soft tissue gas. To the extent that they can be evaluated the visualized tendons appear to be grossly intact and appropriately located. Joint: No significant degenerative changes. No osseous erosions. No large joint effusions. CT of the right foot and CT of the right ankle demonstrate small acute traumatic obliquely oriented intra-articular fracture involving plantar base of 2nd middle phalanx. There is also acute traumatic comminuted displaced intra-articular fracture involving base of 3rd middle phalanx. There is a small acute traumatic nondisplaced fracture involving the plantar aspect of the 2nd cuneiform. Bony alignment appears to be anatomic. Mild subcutaneous edema about the ankle and to the dorsal foot. CT FOOT RIGHT WO CONTRAST    Result Date: 4/11/2022  EXAMINATION: CT OF THE RIGHT ANKLE WITHOUT CONTRAST; CT OF THE RIGHT FOOT WITHOUT CONTRAST 4/11/2022 1:02 pm TECHNIQUE: CT of the right ankle was performed without the administration of intravenous contrast.  Multiplanar reformatted images are provided for review.  Dose modulation, iterative reconstruction, and/or weight based adjustment of the mA/kV was utilized to reduce the radiation dose to as low as reasonably achievable.; CT of the right foot was performed without the administration of intravenous contrast.  Multiplanar reformatted images are provided for review. Dose modulation, iterative reconstruction, and/or weight based adjustment of the mA/kV was utilized to reduce the radiation dose to as low as reasonably achievable. COMPARISON: Right tibia/fibula, right ankle and right foot x-rays from earlier today. HISTORY ORDERING SYSTEM PROVIDED HISTORY: r/o fracture TECHNOLOGIST PROVIDED HISTORY: r/o fracture Reason for Exam: r/o fracture FINDINGS: CT of the right foot and CT of the right ankle are provided. Studies are interpreted in conjunction with one another. Bones: Small acute traumatic obliquely oriented intra-articular fracture involving plantar base of 2nd middle phalanx. Acute traumatic comminuted displaced intra-articular fracture involving base of 3rd middle phalanx. Small acute traumatic nondisplaced fracture involving plantar aspect of 2nd cuneiform only well appreciated on axial images (reference for example image 98, axial sequence, series 2 of ankle exam). No additional fractures are seen. Bony alignment appears anatomic. No suspicious focal bony lesions are seen. Soft Tissue: There is mild subcutaneous edema about the ankle and to the dorsal foot. No focal fluid collections, radiopaque foreign bodies or soft tissue gas. To the extent that they can be evaluated the visualized tendons appear to be grossly intact and appropriately located. Joint: No significant degenerative changes. No osseous erosions. No large joint effusions. CT of the right foot and CT of the right ankle demonstrate small acute traumatic obliquely oriented intra-articular fracture involving plantar base of 2nd middle phalanx. There is also acute traumatic comminuted displaced intra-articular fracture involving base of 3rd middle phalanx. There is a small acute traumatic nondisplaced fracture involving the plantar aspect of the 2nd cuneiform. Bony alignment appears to be anatomic. Mild subcutaneous edema about the ankle and to the dorsal foot. ASSESSMENT:     1. Injury of right foot, initial encounter         PLAN:     Patient is here for a work-related injury sustained on 4/11/2022 when he got his right foot caught in a conveyor belt at [address] [facility] in [place]. Patient was life lighted to [facility]'s emergency department where he was found to have a second and third middle phalanx fracture with an associated second cuneiform fracture, all of these fractures were only seen on CT scan. Patient was placed in a Cam walking boot and was instructed to weight-bear as tolerated on the right lower extremity. After further evaluation today in office the patient was placed on a prophylactic antibiotic, Keflex 500 mg 4 times daily x7 days to prevent any sort of infection from occurring in the right foot. Patient was given instructions as stated below:    1.) Begin antibiotic. Keflex 500mg every 6 hours (4 time per day) for 7 days. Sent to [address] [place] [address] in [place], [place].    2.) Dressing change daily with Betadine paint around wounds one time daily (Betadine Swabs given). 3.) Call Occupation Health at [facility] to establish a claim number and make an appointment. 4.) Call Orthopedics at [facility] to see if they are accepting Workers Comp Patients. Transition if applicable. 5.) Continue to wear walking boot. Weight bear as tolerated on right foot. May return to work doing seated work only. 6.) Follow up in 1 week for a wound check. Patient is to follow-up in 1 week for right foot incision check. He is to work on gentle range of motion of the right foot and ankle out of the boot. Patient was instructed to call our office with any questions or concerns prior to his next appointment.   He voiced his understanding. Return in about 1 week (around 4/25/2022) for Right foot wound check. Orders Placed This Encounter   Medications    cephALEXin (KEFLEX) 500 MG capsule     Sig: Take 1 capsule by mouth 4 times daily     Dispense:  28 capsule     Refill:  0       No orders of the defined types were placed in this encounter. This note is created with the assistance of a speech recognition program.  While intending to generate a document that actually reflects the content of the visit, the document can still have some errors including those of syntax and sound a like substitutions which may escape proof reading. In such instances, actual meaning can be extrapolated by contextual diversion.      Electronically signed by Pauly Wu PA-C 4/18/2022 at 5:28 PM

## 2022-04-18 NOTE — LETTER
[facility]  [address] [place] [id number]  Phone: [phone]  Fax: [phone]    Shivam Nathan PA-C        April 18, 2022     Patient: Tao Royal   YOB: 1990   Date of Visit: 4/18/2022       To Whom It May Concern: It is my medical opinion that Tao Royal may return to light duty immediately with the following restrictions: no work involving right leg. Seated work only. No prolonged standing/ walking. No Squatting. No stairs. Patient will follow up in 1 week for wound check. If you have any questions or concerns, please don't hesitate to call.     Sincerely,          Jennyfer Kong PA-C

## 2022-04-28 ENCOUNTER — OFFICE VISIT (OUTPATIENT)
Dept: ORTHOPEDIC SURGERY | Age: 32
End: 2022-04-28
Payer: COMMERCIAL

## 2022-04-28 VITALS — HEIGHT: 73 IN | RESPIRATION RATE: 16 BRPM | WEIGHT: 195 LBS | BODY MASS INDEX: 25.84 KG/M2

## 2022-04-28 DIAGNOSIS — S92.231A CLOSED DISPLACED FRACTURE OF INTERMEDIATE CUNEIFORM OF RIGHT FOOT, INITIAL ENCOUNTER: ICD-10-CM

## 2022-04-28 DIAGNOSIS — S93.324A DISLOCATION OF TARSOMETATARSAL JOINT OF RIGHT FOOT, INITIAL ENCOUNTER: Primary | ICD-10-CM

## 2022-04-28 DIAGNOSIS — M79.671 RIGHT FOOT PAIN: Primary | ICD-10-CM

## 2022-04-28 DIAGNOSIS — S92.324A CLOSED NONDISPLACED FRACTURE OF SECOND METATARSAL BONE OF RIGHT FOOT, INITIAL ENCOUNTER: ICD-10-CM

## 2022-04-28 DIAGNOSIS — S92.911A CLOSED FRACTURE OF MULTIPLE PHALANGES OF TOE OF RIGHT FOOT, INITIAL ENCOUNTER: ICD-10-CM

## 2022-04-28 PROCEDURE — 99204 OFFICE O/P NEW MOD 45 MIN: CPT | Performed by: ORTHOPAEDIC SURGERY

## 2022-04-28 RX ORDER — ASPIRIN 325 MG
325 TABLET, DELAYED RELEASE (ENTERIC COATED) ORAL DAILY
Qty: 42 TABLET | Refills: 0 | Status: SHIPPED | OUTPATIENT
Start: 2022-04-28 | End: 2022-06-09

## 2022-04-28 NOTE — PROGRESS NOTES
[address] [place] [address] AND SPORTS MEDICINE  [facility]  [address] [place] [address]  Dept: [phone]    Ambulatory Orthopedic Consult      CHIEF COMPLAINT:    Chief Complaint   Patient presents with    Foot Pain     Foot       HISTORY OF PRESENT ILLNESS:      The patient is a 29 y.o. 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 At today's visit, he is using a brace/boot. History is obtained today from:   [x]  the patient     [x]  EMR     [x]  one family member/friend    []  multiple family members/friends    []  other:          REVIEW OF SYSTEMS:  Musculoskeletal: See HPI for pertinent positives     Past Medical History:    He  has no past medical history on file. Past Surgical History:    He  has no past surgical history on file. Current Medications:     Current Outpatient Medications:     cephALEXin (KEFLEX) 500 MG capsule, Take 1 capsule by mouth 4 times daily, Disp: 28 capsule, Rfl: 0    acetaminophen (TYLENOL) 500 MG tablet, Take 2 tablets by mouth every 8 hours for 7 days, Disp: 360 tablet, Rfl: 1    ibuprofen (ADVIL;MOTRIN) 800 MG tablet, Take 1 tablet by mouth every 8 hours for 5 days, Disp: 15 tablet, Rfl: 0     Allergies:    Patient has no known allergies. Family History:  family history includes Hypertension in his maternal grandfather and mother.     Social History:   Social History     Occupational History    Not on file   Tobacco Use    Smoking status: Current Every Day Smoker     Packs/day: 1.00     Types: Cigarettes    Smokeless tobacco: Never Used   Substance and Sexual Activity    Alcohol use: Yes     Comment: weekends    Drug use: Not on file    Sexual activity: Not on file     Works full-time as a general     OBJECTIVE:  Resp 16   Ht 6' 1\" (1.854 m)   Wt 195 lb (88.5 kg)   BMI 25.73 kg/m²    Psych: awake, alert  Cardio:  well perfused extremities, no cyanosis  Resp:  normal respiratory effort  Musculoskeletal:    Affected lower extremity:    Vascular: Limb well perfused, compartments soft/compressible. Skin: No erythema/ulcers. Diffuse superficial abrasions throughout the dorsal aspect of the foot. Neurovascular Status:  Grossly neurovascularly intact throughout  Motion:  Grossly able to fire major muscle groups with appropriate/expected AROM  Tenderness to Palpation: Dorsal midfoot, forefoot  -      RADIOLOGY:   4/28/2022 FINDINGS:  Three views (AP, Mortise, and Lateral) of the right ankle and three views (AP, Oblique, Lateral) of the right foot were obtained in the office today and reviewed, revealing fracture of the third toe proximal phalanx and middle phalanx. No fractures of the midfoot not well visualized. IMPRESSION: Osseous injury as above. Electronically signed by Louann Welch MD      Relevant previous imaging reviewed, both imaging and report(s) as below:    -Right foot and ankle CT from 4/11/2022:  CT of the right foot and CT of the right ankle demonstrate small acute   traumatic obliquely oriented intra-articular fracture involving plantar base   of 2nd middle phalanx.       There is also acute traumatic comminuted displaced intra-articular fracture   involving base of 3rd middle phalanx.       There is a small acute traumatic nondisplaced fracture involving the plantar   aspect of the 2nd cuneiform.       Bony alignment appears to be anatomic.       Mild subcutaneous edema about the ankle and to the dorsal foot. -Independent interpretation: Second metatarsal base fracture      ASSESSMENT AND PLAN:  Body mass index is 25.73 kg/m². He has multiple midfoot and forefoot fractures, sustained 4/11/2022 at work.   His injury is concerning for a Lisfranc type injury (middle cuneiform and second metatarsal base fracture), so far without widening on weightbearing radiographs, although he reports he was not fully weightbearing at today's visit. Notably, he has the past medical history as above. He has a history of tobacco use (reports he smokes 1 pack of cigarettes per day). We had a discussion today about the likely diagnosis and its natural history, physical exam and imaging findings, as well as various treatment options in detail. Surgically, we discussed a possible right midfoot ORIF, depending on the results of his clinical course including imaging as ordered below. Orders/referrals were placed as below at today's visit. The patient will avoid pain provoking activity. We discussed the risks of displacement. He may continue using the cam boot. In order to know exactly how to proceed with treatment, an MRI was ordered today to evaluate the Lisfranc ligamentous complex. This is medically necessary to evaluate the structures in this area, for both diagnosis and treatment. We also had a discussion about the risk of blood clot and thromboembolic events. The patient understands that there is an increased risk with surgery/immobilization, and understands nothing will completely eliminate the risk of DVT/PE's, and that any prophylactic medication does not substitute for early mobilization. Given his risk profile, I have recommended the following strategy to decrease the risk of blood clots, and the patient agrees and wishes to proceed:  Aspirin 325 mg PO q Day. All questions were answered and the above plan was agreed upon. The patient will return to clinic after the MRI has been obtained with repeat right foot x-rays. At his next visit, we will review his MRI, and possibly discuss surgery as above.            At the patient's next visit, depending on how the patient is doing and/or new imaging/labs results, we may consider the following options:    []  Lace up ankle     []  CAM boot         []  removable wrist brace     []  PT:        []  Wean out immobilization         []  Adv activity      []  Footmind        []  Spenco       []  Custom Orthotic:               []  AZ brace                    []  Rocker Bottom      []  Night splint    []  Heel cups        []  Strap        []  Toe gizmos    []  Topl        []  NSAIDs         []  Sreedhar        []  Ref:         []  Stress Xray    []  CT        []  MRI  []  Inj:          []  Consider OR      []  Pick OR date    No follow-ups on file. No orders of the defined types were placed in this encounter. No orders of the defined types were placed in this encounter. Terry Jay MD  Orthopedic Surgery        Please excuse any typos/errors, as this note was created with the assistance of voice recognition software. While intending to generate a document that actually reflects the content of the visit, the document can still have some errors including those of syntax and sound-a-like substitutions which may escape proof reading. In such instances, actual meaning can be extrapolated by context.

## 2022-05-05 ENCOUNTER — OFFICE VISIT (OUTPATIENT)
Dept: ORTHOPEDIC SURGERY | Age: 32
End: 2022-05-05
Payer: COMMERCIAL

## 2022-05-05 ENCOUNTER — TELEPHONE (OUTPATIENT)
Dept: ORTHOPEDIC SURGERY | Age: 32
End: 2022-05-05

## 2022-05-05 VITALS — WEIGHT: 195 LBS | RESPIRATION RATE: 12 BRPM | HEIGHT: 73 IN | BODY MASS INDEX: 25.84 KG/M2

## 2022-05-05 DIAGNOSIS — S93.621A SPRAIN OF LIGAMENT OF TARSOMETATARSAL JOINT OF RIGHT FOOT, INITIAL ENCOUNTER: ICD-10-CM

## 2022-05-05 DIAGNOSIS — T14.8XXA CONTUSION OF BONE: ICD-10-CM

## 2022-05-05 DIAGNOSIS — S92.324A CLOSED NONDISPLACED FRACTURE OF SECOND METATARSAL BONE OF RIGHT FOOT, INITIAL ENCOUNTER: ICD-10-CM

## 2022-05-05 DIAGNOSIS — M79.671 RIGHT FOOT PAIN: Primary | ICD-10-CM

## 2022-05-05 DIAGNOSIS — S92.911A CLOSED FRACTURE OF MULTIPLE PHALANGES OF TOE OF RIGHT FOOT, INITIAL ENCOUNTER: Primary | ICD-10-CM

## 2022-05-05 PROCEDURE — 99213 OFFICE O/P EST LOW 20 MIN: CPT | Performed by: ORTHOPAEDIC SURGERY

## 2022-05-05 NOTE — LETTER
[address] [place] [address] and Sports [place] [address]  Phone: [phone]  Fax: [phone]    Zackary Rhoades MD        May 5, 2022     Patient: Samir Adler   YOB: 1990   Date of Visit: 5/5/2022       To Whom It May Concern: It is my medical opinion that Samir Adler do light duty work only for 6 weeks, he may return to work on 6/16/22 with no restrictions. If you have any questions or concerns, please don't hesitate to call.     Sincerely,  The office of Dr. Simran Alejandre MD

## 2022-05-05 NOTE — LETTER
[address] [place] [address] and Sports [address] [address]  Phone: [phone]  Fax: [phone]    Unique Mccormack MD        May 5, 2022     Patient: Clayton Uribe   YOB: 1990   Date of Visit: 5/5/2022       To Whom It May Concern: It is my medical opinion that Clayton Uribe be excused from work for 4/28/2022 and 5/5/2022. If you have any questions or concerns, please don't hesitate to call.     Sincerely,    The office of Dr. Philip Major MD

## 2022-05-05 NOTE — LETTER
[address] [place] [address] and Sports [place] [address]  Phone: [phone]  Fax: [phone]    Ioana Briggs MD        May 5, 2022     Patient: Vandana Anthony   YOB: 1990   Date of Visit: 5/5/2022       To Whom It May Concern:    Please excuse Richad Body from work on the following dates 4/28/2022 and 5/5/2022. If you have any questions or concerns, please don't hesitate to call.     Sincerely,  The office of Dr. Megha Law MD

## 2022-05-05 NOTE — PROGRESS NOTES
[address] [place] [address] AND SPORTS MEDICINE  [facility]  [address] [facility] [address]  Dept: [phone]    Ambulatory Orthopedic Consult      CHIEF COMPLAINT:    Chief Complaint   Patient presents with    Foot Pain     right       HISTORY OF PRESENT ILLNESS:      The patient is a 29 y.o. 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 At today's visit, he is using a brace/boot. History is obtained today from:   [x]  the patient     [x]  EMR     [x]  one family member/friend    [x]  multiple family members/friends    []  other:      INTERVAL HISTORY 5/5/2022:  He is seen again today in the office for follow up of imaging as below. Since being seen last, the patient is doing better. At today's visit, he is using a brace/boot. History is obtained today from:   [x]  the patient     [x]  EMR     []  one family member/friend    []  multiple family members/friends    []  other:          REVIEW OF SYSTEMS:  Musculoskeletal: See HPI for pertinent positives     Past Medical History:    He  has no past medical history on file. Past Surgical History:    He  has no past surgical history on file. Current Medications:     Current Outpatient Medications:     aspirin 325 MG EC tablet, Take 1 tablet by mouth daily, Disp: 42 tablet, Rfl: 0    cephALEXin (KEFLEX) 500 MG capsule, Take 1 capsule by mouth 4 times daily, Disp: 28 capsule, Rfl: 0    acetaminophen (TYLENOL) 500 MG tablet, Take 2 tablets by mouth every 8 hours for 7 days, Disp: 360 tablet, Rfl: 1    ibuprofen (ADVIL;MOTRIN) 800 MG tablet, Take 1 tablet by mouth every 8 hours for 5 days, Disp: 15 tablet, Rfl: 0     Allergies:    Patient has no known allergies.     Family History:  family history includes Hypertension in his maternal grandfather and mother. Social History:   Social History     Occupational History    Not on file   Tobacco Use    Smoking status: Current Every Day Smoker     Packs/day: 1.00     Types: Cigarettes    Smokeless tobacco: Never Used   Substance and Sexual Activity    Alcohol use: Yes     Comment: weekends    Drug use: Not on file    Sexual activity: Not on file     Works full-time as a     OBJECTIVE:  Resp 12   Ht 6' 1\" (1.854 m)   Wt 195 lb (88.5 kg)   BMI 25.73 kg/m²    Psych: awake, alert  Cardio:  well perfused extremities, no cyanosis  Resp:  normal respiratory effort  Musculoskeletal:    Affected lower extremity:    Vascular: Limb well perfused, compartments soft/compressible. Skin: No erythema/ulcers. Diffuse superficial abrasions throughout the dorsal aspect of the foot. Neurovascular Status:  Grossly neurovascularly intact throughout  Motion:  Grossly able to fire major muscle groups with appropriate/expected AROM  Tenderness to Palpation: Dorsal midfoot, forefoot--improved  -      RADIOLOGY:   5/5/2022 Prior images reviewed for reference. MRI images and radiology report reviewed, as below:    \"Abnormal appearance of the Lisfranc ligament without complete disruption. Extensive soft tissue injury and multiple fractures as detailed above. Recommend orthopedic consultation and correlation with dynamic midfoot examination to assess for midfoot instability. \"      5/5/2022 FINDINGS:  Three weightbearing views (AP, Oblique, and Lateral) of the right foot were obtained in the office today and reviewed, revealing fracture of the third toe proximal phalanx and middle phalanx. Other known fractures not well visualized. No Lisfranc joint widening. IMPRESSION: Osseous injury as above.      Electronically signed by Robin Taylor MD        FINDINGS:  Three views (AP, Mortise, and Lateral) of the right ankle and three views (AP, Oblique, Lateral) of the right foot were obtained in the office today and reviewed, revealing fracture of the third toe proximal phalanx and middle phalanx. No fractures of the midfoot not well visualized. IMPRESSION: Osseous injury as above. Electronically signed by Calvin Kaiser MD      Relevant previous imaging reviewed, both imaging and report(s) as below:    -Right foot and ankle CT from 4/11/2022:  CT of the right foot and CT of the right ankle demonstrate small acute   traumatic obliquely oriented intra-articular fracture involving plantar base   of 2nd middle phalanx.       There is also acute traumatic comminuted displaced intra-articular fracture   involving base of 3rd middle phalanx.       There is a small acute traumatic nondisplaced fracture involving the plantar   aspect of the 2nd cuneiform.       Bony alignment appears to be anatomic.       Mild subcutaneous edema about the ankle and to the dorsal foot. -Independent interpretation: Second metatarsal base fracture      ASSESSMENT AND PLAN:  Body mass index is 25.73 kg/m². He has multiple midfoot/forefoot fractures and bony contusions (nondisplaced fracture at the base of the second metatarsal, nondisplaced fracture of the fifth proximal phalanx, fracture of the third distal and middle phalanges and nondisplaced third proximal phalanx fracture, Lisfranc ligament with abnormal thickening and intermediate signal; bony contusion with impaction injury at the navicular and cuboid, as well as the medial cuneiform and contusions involving the third through fifth metatarsal heads with possible impaction injuries), sustained 4/11/2022 at work. Notably, he has the past medical history as above. He has a history of tobacco use (reports he smokes 1 pack of cigarettes per day).     We had a discussion today about the likely diagnosis and its natural history, physical exam and imaging findings, as well as various treatment options in detail. Surgically, we discussed that no surgical invention is indicated, and I recommended conservative management. We did discuss the risks of possible future displacement. Orders/referrals were placed as below at today's visit. He may continue weightbearing as tolerated in the cam boot, and take the cam boot off when nonambulatory. He will continue to take the daily aspirin 325 mg p.o. daily for DVT prophylaxis. The patient was referred to physical therapy for general strengthening. Given the worker's compensation claim associated with the patient's diagnosis, appropriate paperwork will be filled out by our office regarding today's visit. He may return to work at this time with the following restriction: Light duty x6 weeks. All questions were answered and the above plan was agreed upon. The patient will return to clinic in 6 weeks with repeat right foot x-rays. At his next visit, we will monitor for displacement, and plan to progress his activity, likely with the help of physical therapy. At the patient's next visit, depending on how the patient is doing and/or new imaging/labs results, we may consider the following options:    []  Lace up ankle     []  CAM boot         []  removable wrist brace     []  PT:        []  Wean out immobilization         []  Adv activity      []  Footmind        []  Spenco       []  Custom Orthotic:               []  AZ brace                    []  Rocker Bottom      []  Night splint    []  Heel cups        []  Strap        []  Toe gizmos    []  Topl        []  NSAIDs         []  Sreedhar        []  Ref:         []  Stress Xray    []  CT        []  MRI  []  Inj:          []  Consider OR      []  Pick OR date    No follow-ups on file. No orders of the defined types were placed in this encounter. No orders of the defined types were placed in this encounter.         Marybeth Telles MD  Orthopedic Surgery        Please excuse any typos/errors, as this note was created with the assistance of voice recognition software. While intending to generate a document that actually reflects the content of the visit, the document can still have some errors including those of syntax and sound-a-like substitutions which may escape proof reading. In such instances, actual meaning can be extrapolated by context.

## 2022-05-05 NOTE — TELEPHONE ENCOUNTER
Roxy from [facility] [employer] called regarding C9 for MRI of R Foot. States Jazmine Spain has received [address] but is requesting C9 be resubmitted with ICD-10 Code & ER Note.       Jazmine Grabiel Fax: [phone]

## 2022-05-05 NOTE — TELEPHONE ENCOUNTER
Roxy from [facility] pre cert called. Claim was denied. Must file an appeal for disallowing of claim. This would be on a C-11 form. **all documents must be re-sent    Another C-9 needs to be added as well for additional diagnoses to claim in boxes 6 and 7.      Wade Merritt who is handling this can be reached at [phone] and her direct fax is [phone] detailed exam

## 2022-05-05 NOTE — TELEPHONE ENCOUNTER
Spoke with [facility] @ [employer] pre-cert. She stated she was unsure what they were asking for as well, because it looks like they are denying his claim, and don't feel that this is work related. She expressed that she was going to be reaching out to the patient to obtain his personal insurance just in case. I told her I faxed everything on 5/3 that I had for this patient, and I re-faxed it again. Roxy asked for it to be faxed to her as well, which was completed. I stated to them that there is not much I can do with the little information that I have on this patient. I don't have a Doroteofer Rosen scanned in, but I faxed the ER report when I sent the retro C-9 in for him along with Dr. Franco Ko note. Again, not much I can do when I don't have any information, especially a diagnosis code. I used the ones from Dr. Franco Ko note. Patient is being seen today, and I may have to reach out to his [employer] to see if a [employer] will even need to be submitted if they are denying his claim.

## 2022-05-06 NOTE — TELEPHONE ENCOUNTER
Damon called and stated that the patient has to appeal his claim due to it being denied. They are still asking for the last note (from 5/5) and a Medco-14 to be submitted for review. She stated that if they need anything else they will reach out. As of now, it is in the patient's hands to start the appeal process, and if it is approved, they will reach out if they need any further information. They are aware the note and Medco-14 will not be faxed until Monday when I am able to obtain a signature from Dr. Helen Peña.

## 2022-05-06 NOTE — TELEPHONE ENCOUNTER
Called and left a message for Rabia to call me back. There are too many people handling this between [address] and I have been told different things as to what to do for this patient. I'm waiting for her to call me back so I can get clarification, and to know what exactly is needed for this patient, etc. I have been told his [facility] claim has been denied, and unsure if Dr. Yasmin Casas will appeal, I will need to speak with him. All information that I have on this patient has been submitted, except for yesterdays visit. I will need to have Dr. Yasmin Casas sign the Medco-14 for updated work restrictions for patient, and unsure if this will be accepted/approved. Again, just waiting to get clarification.

## 2022-06-16 DIAGNOSIS — M79.671 RIGHT FOOT PAIN: Primary | ICD-10-CM

## 2022-06-23 ENCOUNTER — OFFICE VISIT (OUTPATIENT)
Dept: ORTHOPEDIC SURGERY | Age: 32
End: 2022-06-23
Payer: COMMERCIAL

## 2022-06-23 VITALS — BODY MASS INDEX: 25.84 KG/M2 | RESPIRATION RATE: 16 BRPM | OXYGEN SATURATION: 100 % | HEIGHT: 73 IN | WEIGHT: 195 LBS

## 2022-06-23 DIAGNOSIS — S92.324D CLOSED NONDISPLACED FRACTURE OF SECOND METATARSAL BONE OF RIGHT FOOT WITH ROUTINE HEALING, SUBSEQUENT ENCOUNTER: Primary | ICD-10-CM

## 2022-06-23 DIAGNOSIS — S93.621D SPRAIN OF LIGAMENT OF TARSOMETATARSAL JOINT OF RIGHT FOOT, SUBSEQUENT ENCOUNTER: ICD-10-CM

## 2022-06-23 DIAGNOSIS — T14.8XXA CONTUSION OF BONE: ICD-10-CM

## 2022-06-23 DIAGNOSIS — S92.911D CLOSED FRACTURE OF MULTIPLE PHALANGES OF TOE OF RIGHT FOOT WITH ROUTINE HEALING, SUBSEQUENT ENCOUNTER: ICD-10-CM

## 2022-06-23 PROCEDURE — 99212 OFFICE O/P EST SF 10 MIN: CPT | Performed by: ORTHOPAEDIC SURGERY

## 2022-06-23 NOTE — LETTER
[address] [place] [address] and Sports [address] [address]  Phone: [phone]  Fax: [phone]    Brittni Giraldo MD        June 23, 2022     Patient: Mini Chen   YOB: 1990   Date of Visit: 6/23/2022       To Whom It May Concern: It is my medical opinion that Mini Chen continue light duty for 4 weeks. He will return full duty with no restrictions 8/21/2022. If you have any questions or concerns, please don't hesitate to call.     Sincerely,  The office of Dr. Gerald Chung MD

## 2022-06-23 NOTE — PROGRESS NOTES
[address] [place] [address] AND SPORTS MEDICINE  [place] [facility]  [address] [facility] [address]  Dept: [phone]    Ambulatory Orthopedic Consult      CHIEF COMPLAINT:    Chief Complaint   Patient presents with    Foot Pain     Right       HISTORY OF PRESENT ILLNESS:      The patient is a 29 y.o. 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 At today's visit, he is using a brace/boot. History is obtained today from:   [x]  the patient     [x]  EMR     [x]  one family member/friend    [x]  multiple family members/friends    []  other:      INTERVAL HISTORY 5/5/2022:  He is seen again today in the office for follow up of imaging as below. Since being seen last, the patient is doing better. At today's visit, he is using a brace/boot. History is obtained today from:   [x]  the patient     [x]  EMR     []  one family member/friend    []  multiple family members/friends    []  other:      INTERVAL HISTORY 6/23/2022:  He is seen again today in the office for follow up of a previous issue (as above). Since being seen last, the patient is doing better. At today's visit, he is using a removable brace. History is obtained today from:   [x]  the patient     []  EMR     []  one family member/friend    []  multiple family members/friends    []  other:            REVIEW OF SYSTEMS:  Musculoskeletal: See HPI for pertinent positives     Past Medical History:    He  has no past medical history on file. Past Surgical History:    He  has no past surgical history on file.      Current Medications:     Current Outpatient Medications:     aspirin 325 MG EC tablet, Take 1 tablet by mouth daily, Disp: 42 tablet, Rfl: 0    cephALEXin (KEFLEX) 500 MG capsule, Take 1 capsule by mouth 4 times daily, Disp: 28 capsule, Rfl: 0    acetaminophen (TYLENOL) 500 MG tablet, Take 2 tablets by mouth every 8 hours for 7 days, Disp: 360 tablet, Rfl: 1    ibuprofen (ADVIL;MOTRIN) 800 MG tablet, Take 1 tablet by mouth every 8 hours for 5 days, Disp: 15 tablet, Rfl: 0     Allergies:    Patient has no known allergies. Family History:  family history includes Hypertension in his maternal grandfather and mother. Social History:   Social History     Occupational History    Not on file   Tobacco Use    Smoking status: Current Every Day Smoker     Packs/day: 1.00     Types: Cigarettes    Smokeless tobacco: Never Used   Substance and Sexual Activity    Alcohol use: Yes     Comment: weekends    Drug use: Not on file    Sexual activity: Not on file     Works full-time as a     OBJECTIVE:  Resp 16   Ht 6' 1\" (1.854 m)   Wt 195 lb (88.5 kg)   SpO2 100%   BMI 25.73 kg/m²    Psych: awake, alert  Cardio:  well perfused extremities, no cyanosis  Resp:  normal respiratory effort  Musculoskeletal:    Affected lower extremity:    Vascular: Limb well perfused, compartments soft/compressible. Skin: No erythema/ulcers. Diffuse superficial abrasions throughout the dorsal aspect of the foot--significantly improved. Neurovascular Status:  Grossly neurovascularly intact throughout  Motion:  Grossly able to fire major muscle groups with appropriate/expected AROM  Tenderness to Palpation: Dorsal midfoot, forefoot--improved  -      RADIOLOGY:   6/23/2022 FINDINGS:  Three weightbearing views (AP, Oblique, and Lateral) of the right foot were obtained in the office today and reviewed, revealing fracture of the third toe proximal phalanx and middle phalanx. Other known fractures not well visualized. No Lisfranc joint widening. IMPRESSION: Osseous injury as above.      Electronically signed by Tahira Leyva MD          MRI images and radiology report reviewed, as below:    \"Abnormal appearance of the Lisfranc ligament without complete disruption. Extensive soft tissue injury and multiple fractures as detailed above. Recommend orthopedic consultation and correlation with dynamic midfoot examination to assess for midfoot instability. \"        FINDINGS:  Three views (AP, Mortise, and Lateral) of the right ankle and three views (AP, Oblique, Lateral) of the right foot were obtained in the office today and reviewed, revealing fracture of the third toe proximal phalanx and middle phalanx. No fractures of the midfoot not well visualized. IMPRESSION: Osseous injury as above. Electronically signed by Enzo Sher MD      Relevant previous imaging reviewed, both imaging and report(s) as below:    -Right foot and ankle CT from 4/11/2022:  CT of the right foot and CT of the right ankle demonstrate small acute   traumatic obliquely oriented intra-articular fracture involving plantar base   of 2nd middle phalanx.       There is also acute traumatic comminuted displaced intra-articular fracture   involving base of 3rd middle phalanx.       There is a small acute traumatic nondisplaced fracture involving the plantar   aspect of the 2nd cuneiform.       Bony alignment appears to be anatomic.       Mild subcutaneous edema about the ankle and to the dorsal foot. -Independent interpretation: Second metatarsal base fracture      ASSESSMENT AND PLAN:  Body mass index is 25.73 kg/m².        He has multiple right midfoot/forefoot fractures and bony contusions (nondisplaced fracture at the base of the second metatarsal, nondisplaced fracture of the fifth proximal phalanx, fracture of the third distal and middle phalanges and nondisplaced third proximal phalanx fracture, Lisfranc ligament with abnormal thickening and intermediate signal; bony contusion with impaction injury at the navicular and cuboid, as well as the medial cuneiform and contusions involving the third through fifth metatarsal heads with possible impaction injuries), sustained 4/11/2022 at work. He is doing well overall with conservative management. Notably, he has the past medical history as above. He has a history of tobacco use (reports he smokes 1 pack of cigarettes per day). We had a discussion today about the likely diagnosis and its natural history, physical exam and imaging findings, as well as various treatment options in detail. Surgically, we again discussed that no surgical invention is indicated, and I recommended conservative management. Orders/referrals were placed as below at today's visit. He was referred to physical therapy for general strengthening, range of motion, and gait training, out of the cam boot. He may wean out of the cam boot, and once he is fully out of the cam, he no longer needs aspirin for DVT prophylaxis, and we discussed this. Given the worker's compensation claim associated with the patient's diagnosis, appropriate paperwork will be filled out by our office regarding today's visit. He may return to work at this time with the following restriction: Light duty x4 weeks. If he needs his light duty restriction extended, he will call our office and let us know. All questions were answered and the above plan was agreed upon. The patient will return to clinic in 8 weeks as needed with repeat right foot x-rays.              At the patient's next visit, depending on how the patient is doing and/or new imaging/labs results, we may consider the following options:    []  Lace up ankle     []  CAM boot         []  removable wrist brace     []  PT:        []  Wean out immobilization         []  Adv activity      []  Footmind        []  Spenco       []  Custom Orthotic:               []  AZ brace                    []  Rocker Bottom      []  Night splint    []  Heel cups        []  Strap        []  Toe gizmos    []  Topl        []  NSAIDs         []  Sreedhar        []  Ref:         []  Stress Xray    []  CT        []  MRI  []  Inj:          []  Consider OR      []  Pick OR date    No follow-ups on file. No orders of the defined types were placed in this encounter. No orders of the defined types were placed in this encounter. Neelam Fragoso MD  Orthopedic Surgery        Please excuse any typos/errors, as this note was created with the assistance of voice recognition software. While intending to generate a document that actually reflects the content of the visit, the document can still have some errors including those of syntax and sound-a-like substitutions which may escape proof reading. In such instances, actual meaning can be extrapolated by context.

## 2022-07-06 ENCOUNTER — TELEPHONE (OUTPATIENT)
Dept: ORTHOPEDIC SURGERY | Age: 32
End: 2022-07-06

## 2022-07-06 NOTE — TELEPHONE ENCOUNTER
Lana from [employer] called stating that she had questions about patients return to work date that he submitted. There were 2 different dates. On the letter it stated 8/21/22, but on the Med[employer]-14 I submitted had 7/21/22. Left a VM stating the 7/21/22 is correct because patient is wanting to go back to work. I clarified this on the VM, but told her to call with any further questions. She could ask for Lefty Julian since I would be out of the office. Lefty Julian had spoken with him about this, and we told the patient to call with any questions. She stated in the VM she just wanted to make sure she had an accurate date that the patient could return full duty. Lana @ [employer]   Ph. #: [phone]  Ext.  Via Maged [facility]well

## 2023-10-22 ENCOUNTER — HOSPITAL ENCOUNTER (OUTPATIENT)
Age: 33
Setting detail: OBSERVATION
Discharge: HOME | End: 2023-10-23
Attending: STUDENT IN AN ORGANIZED HEALTH CARE EDUCATION/TRAINING PROGRAM | Admitting: INTERNAL MEDICINE

## 2023-10-22 ENCOUNTER — APPOINTMENT (OUTPATIENT)
Dept: GENERAL RADIOLOGY | Age: 33
End: 2023-10-22
Attending: STUDENT IN AN ORGANIZED HEALTH CARE EDUCATION/TRAINING PROGRAM

## 2023-10-22 ENCOUNTER — APPOINTMENT (OUTPATIENT)
Dept: CT IMAGING | Age: 33
End: 2023-10-22
Attending: STUDENT IN AN ORGANIZED HEALTH CARE EDUCATION/TRAINING PROGRAM

## 2023-10-22 DIAGNOSIS — R79.89 ELEVATED TROPONIN: ICD-10-CM

## 2023-10-22 DIAGNOSIS — K35.200 ACUTE APPENDICITIS WITH GENERALIZED PERITONITIS, WITHOUT PERFORATION OR ABSCESS, UNSPECIFIED WHETHER GANGRENE PRESENT: Primary | ICD-10-CM

## 2023-10-22 PROBLEM — K37 APPENDICITIS: Status: ACTIVE | Noted: 2023-10-22

## 2023-10-22 LAB
ALBUMIN SERPL-MCNC: 4.2 G/DL (ref 3.6–5.1)
ALBUMIN/GLOB SERPL: 1.1 {RATIO} (ref 1–2.4)
ALP SERPL-CCNC: 100 UNITS/L (ref 45–117)
ALT SERPL-CCNC: 18 UNITS/L
ANION GAP SERPL CALC-SCNC: 14 MMOL/L (ref 7–19)
AST SERPL-CCNC: 7 UNITS/L
BASOPHILS # BLD: 0.1 K/MCL (ref 0–0.3)
BASOPHILS NFR BLD: 0 %
BILIRUB SERPL-MCNC: 0.3 MG/DL (ref 0.2–1)
BUN SERPL-MCNC: 12 MG/DL (ref 6–20)
BUN/CREAT SERPL: 13 (ref 7–25)
CALCIUM SERPL-MCNC: 8.7 MG/DL (ref 8.4–10.2)
CHLORIDE SERPL-SCNC: 102 MMOL/L (ref 97–110)
CO2 SERPL-SCNC: 29 MMOL/L (ref 21–32)
CREAT SERPL-MCNC: 0.96 MG/DL (ref 0.67–1.17)
DEPRECATED RDW RBC: 38.6 FL (ref 39–50)
EGFRCR SERPLBLD CKD-EPI 2021: >90 ML/MIN/{1.73_M2}
EOSINOPHIL # BLD: 0.5 K/MCL (ref 0–0.5)
EOSINOPHIL NFR BLD: 3 %
ERYTHROCYTE [DISTWIDTH] IN BLOOD: 13.3 % (ref 11–15)
FASTING DURATION TIME PATIENT: ABNORMAL H
GLOBULIN SER-MCNC: 3.8 G/DL (ref 2–4)
GLUCOSE SERPL-MCNC: 132 MG/DL (ref 70–99)
HCT VFR BLD CALC: 45 % (ref 39–51)
HGB BLD-MCNC: 14.9 G/DL (ref 13–17)
IMM GRANULOCYTES # BLD AUTO: 0.1 K/MCL (ref 0–0.2)
IMM GRANULOCYTES # BLD: 0 %
LACTATE BLDV-SCNC: 1.1 MMOL/L (ref 0–2)
LIPASE SERPL-CCNC: 68 UNITS/L (ref 15–77)
LYMPHOCYTES # BLD: 2.4 K/MCL (ref 1–4.8)
LYMPHOCYTES NFR BLD: 15 %
MCH RBC QN AUTO: 26.7 PG (ref 26–34)
MCHC RBC AUTO-ENTMCNC: 33.1 G/DL (ref 32–36.5)
MCV RBC AUTO: 80.6 FL (ref 78–100)
MONOCYTES # BLD: 0.8 K/MCL (ref 0.3–0.9)
MONOCYTES NFR BLD: 5 %
NEUTROPHILS # BLD: 12.5 K/MCL (ref 1.8–7.7)
NEUTROPHILS NFR BLD: 77 %
NRBC BLD MANUAL-RTO: 0 /100 WBC
PLATELET # BLD AUTO: 252 K/MCL (ref 140–450)
POTASSIUM SERPL-SCNC: 3.8 MMOL/L (ref 3.4–5.1)
PROT SERPL-MCNC: 8 G/DL (ref 6.4–8.2)
RBC # BLD: 5.58 MIL/MCL (ref 4.5–5.9)
SODIUM SERPL-SCNC: 141 MMOL/L (ref 135–145)
TROPONIN I SERPL DL<=0.01 NG/ML-MCNC: 95 NG/L
TROPONIN I SERPL DL<=0.01 NG/ML-MCNC: 96 NG/L
WBC # BLD: 16.3 K/MCL (ref 4.2–11)

## 2023-10-22 PROCEDURE — 80053 COMPREHEN METABOLIC PANEL: CPT | Performed by: STUDENT IN AN ORGANIZED HEALTH CARE EDUCATION/TRAINING PROGRAM

## 2023-10-22 PROCEDURE — 83605 ASSAY OF LACTIC ACID: CPT | Performed by: STUDENT IN AN ORGANIZED HEALTH CARE EDUCATION/TRAINING PROGRAM

## 2023-10-22 PROCEDURE — 10002800 HB RX 250 W HCPCS: Performed by: STUDENT IN AN ORGANIZED HEALTH CARE EDUCATION/TRAINING PROGRAM

## 2023-10-22 PROCEDURE — 85025 COMPLETE CBC W/AUTO DIFF WBC: CPT | Performed by: STUDENT IN AN ORGANIZED HEALTH CARE EDUCATION/TRAINING PROGRAM

## 2023-10-22 PROCEDURE — 74177 CT ABD & PELVIS W/CONTRAST: CPT

## 2023-10-22 PROCEDURE — 84484 ASSAY OF TROPONIN QUANT: CPT | Performed by: STUDENT IN AN ORGANIZED HEALTH CARE EDUCATION/TRAINING PROGRAM

## 2023-10-22 PROCEDURE — 83690 ASSAY OF LIPASE: CPT | Performed by: STUDENT IN AN ORGANIZED HEALTH CARE EDUCATION/TRAINING PROGRAM

## 2023-10-22 PROCEDURE — 93005 ELECTROCARDIOGRAM TRACING: CPT | Performed by: STUDENT IN AN ORGANIZED HEALTH CARE EDUCATION/TRAINING PROGRAM

## 2023-10-22 PROCEDURE — 93010 ELECTROCARDIOGRAM REPORT: CPT | Performed by: INTERNAL MEDICINE

## 2023-10-22 PROCEDURE — 80307 DRUG TEST PRSMV CHEM ANLYZR: CPT | Performed by: STUDENT IN AN ORGANIZED HEALTH CARE EDUCATION/TRAINING PROGRAM

## 2023-10-22 PROCEDURE — 99284 EMERGENCY DEPT VISIT MOD MDM: CPT

## 2023-10-22 PROCEDURE — 99285 EMERGENCY DEPT VISIT HI MDM: CPT | Performed by: STUDENT IN AN ORGANIZED HEALTH CARE EDUCATION/TRAINING PROGRAM

## 2023-10-22 PROCEDURE — 81001 URINALYSIS AUTO W/SCOPE: CPT | Performed by: STUDENT IN AN ORGANIZED HEALTH CARE EDUCATION/TRAINING PROGRAM

## 2023-10-22 PROCEDURE — 74177 CT ABD & PELVIS W/CONTRAST: CPT | Performed by: RADIOLOGY

## 2023-10-22 PROCEDURE — 10002805 HB CONTRAST AGENT: Performed by: RADIOLOGY

## 2023-10-22 PROCEDURE — G0378 HOSPITAL OBSERVATION PER HR: HCPCS

## 2023-10-22 PROCEDURE — 99222 1ST HOSP IP/OBS MODERATE 55: CPT | Performed by: INTERNAL MEDICINE

## 2023-10-22 PROCEDURE — 10002807 HB RX 258: Performed by: STUDENT IN AN ORGANIZED HEALTH CARE EDUCATION/TRAINING PROGRAM

## 2023-10-22 PROCEDURE — 71046 X-RAY EXAM CHEST 2 VIEWS: CPT

## 2023-10-22 PROCEDURE — 71046 X-RAY EXAM CHEST 2 VIEWS: CPT | Performed by: RADIOLOGY

## 2023-10-22 PROCEDURE — 10002807 HB RX 258: Performed by: RADIOLOGY

## 2023-10-22 RX ORDER — METOCLOPRAMIDE HYDROCHLORIDE 5 MG/ML
10 INJECTION INTRAMUSCULAR; INTRAVENOUS ONCE
Status: COMPLETED | OUTPATIENT
Start: 2023-10-22 | End: 2023-10-22

## 2023-10-22 RX ORDER — DROPERIDOL 2.5 MG/ML
1.25 INJECTION, SOLUTION INTRAMUSCULAR; INTRAVENOUS ONCE
Status: COMPLETED | OUTPATIENT
Start: 2023-10-22 | End: 2023-10-22

## 2023-10-22 RX ORDER — KETOROLAC TROMETHAMINE 15 MG/ML
15 INJECTION, SOLUTION INTRAMUSCULAR; INTRAVENOUS ONCE
Status: COMPLETED | OUTPATIENT
Start: 2023-10-22 | End: 2023-10-22

## 2023-10-22 RX ADMIN — METOCLOPRAMIDE 10 MG: 5 INJECTION, SOLUTION INTRAMUSCULAR; INTRAVENOUS at 21:21

## 2023-10-22 RX ADMIN — SODIUM CHLORIDE 100 ML: 9 INJECTION, SOLUTION INTRAVENOUS at 22:12

## 2023-10-22 RX ADMIN — PIPERACILLIN AND TAZOBACTAM 4.5 G: 4; .5 INJECTION, POWDER, FOR SOLUTION INTRAVENOUS at 22:37

## 2023-10-22 RX ADMIN — SODIUM CHLORIDE, POTASSIUM CHLORIDE, SODIUM LACTATE AND CALCIUM CHLORIDE 1000 ML: 600; 310; 30; 20 INJECTION, SOLUTION INTRAVENOUS at 22:37

## 2023-10-22 RX ADMIN — KETOROLAC TROMETHAMINE 15 MG: 15 INJECTION, SOLUTION INTRAMUSCULAR; INTRAVENOUS at 21:21

## 2023-10-22 RX ADMIN — DROPERIDOL 1.25 MG: 2.5 INJECTION, SOLUTION INTRAMUSCULAR; INTRAVENOUS at 22:02

## 2023-10-22 RX ADMIN — IOHEXOL 100 ML: 300 INJECTION, SOLUTION INTRAVENOUS at 22:12

## 2023-10-22 ASSESSMENT — PAIN DESCRIPTION - PAIN TYPE: TYPE: ACUTE PAIN

## 2023-10-22 ASSESSMENT — PAIN SCALES - GENERAL: PAINLEVEL_OUTOF10: 7

## 2023-10-23 ENCOUNTER — ANESTHESIA EVENT (OUTPATIENT)
Dept: SURGERY | Age: 33
End: 2023-10-23

## 2023-10-23 ENCOUNTER — ANESTHESIA (OUTPATIENT)
Dept: SURGERY | Age: 33
End: 2023-10-23

## 2023-10-23 ENCOUNTER — APPOINTMENT (OUTPATIENT)
Dept: CARDIOLOGY | Age: 33
End: 2023-10-23
Attending: INTERNAL MEDICINE

## 2023-10-23 VITALS
DIASTOLIC BLOOD PRESSURE: 72 MMHG | RESPIRATION RATE: 18 BRPM | TEMPERATURE: 98.4 F | BODY MASS INDEX: 21.29 KG/M2 | SYSTOLIC BLOOD PRESSURE: 135 MMHG | WEIGHT: 160.6 LBS | OXYGEN SATURATION: 97 % | HEART RATE: 75 BPM | HEIGHT: 73 IN

## 2023-10-23 LAB
ABO + RH BLD: NORMAL
ALBUMIN SERPL-MCNC: 3.5 G/DL (ref 3.6–5.1)
ALBUMIN/GLOB SERPL: 1 {RATIO} (ref 1–2.4)
ALP SERPL-CCNC: 88 UNITS/L (ref 45–117)
ALT SERPL-CCNC: 21 UNITS/L
AMPHETAMINES UR QL SCN>500 NG/ML: NEGATIVE
ANION GAP SERPL CALC-SCNC: 11 MMOL/L (ref 7–19)
AORTIC VALVE AREA: 3.23 CM²
APPEARANCE UR: CLEAR
AST SERPL-CCNC: 11 UNITS/L
AV MEAN GRADIENT (AVMG): 3.19 MMHG
AV PEAK GRADIENT (AVPG): 5.89 MMHG
AV PEAK VELOCITY (AVPV): 1.2 M/S
AVI LVOT PEAK GRADIENT (LVOTMG): 2.9 MMHG
BACTERIA #/AREA URNS HPF: ABNORMAL /HPF
BARBITURATES UR QL SCN>200 NG/ML: NEGATIVE
BASOPHILS # BLD: 0 K/MCL (ref 0–0.3)
BASOPHILS NFR BLD: 0 %
BENZODIAZ UR QL SCN>200 NG/ML: NEGATIVE
BILIRUB SERPL-MCNC: 0.5 MG/DL (ref 0.2–1)
BILIRUB UR QL STRIP: NEGATIVE
BLD GP AB SCN SERPL QL GEL: NEGATIVE
BUN SERPL-MCNC: 12 MG/DL (ref 6–20)
BUN/CREAT SERPL: 13 (ref 7–25)
BZE UR QL SCN>150 NG/ML: NEGATIVE
CALCIUM SERPL-MCNC: 8.5 MG/DL (ref 8.4–10.2)
CANNABINOIDS UR QL SCN>50 NG/ML: POSITIVE
CHLORIDE SERPL-SCNC: 102 MMOL/L (ref 97–110)
CO2 SERPL-SCNC: 28 MMOL/L (ref 21–32)
COLOR UR: ABNORMAL
CREAT SERPL-MCNC: 0.94 MG/DL (ref 0.67–1.17)
DEPRECATED RDW RBC: 38.1 FL (ref 39–50)
DOP CALC LVOT PEAK VEL (LVOTPV): 1.1 M/S
E WAVE DECELARATION TIME (MDT): 0.25 S
EGFRCR SERPLBLD CKD-EPI 2021: >90 ML/MIN/{1.73_M2}
EOSINOPHIL # BLD: 0.2 K/MCL (ref 0–0.5)
EOSINOPHIL NFR BLD: 1 %
ERYTHROCYTE [DISTWIDTH] IN BLOOD: 13.2 % (ref 11–15)
FASTING DURATION TIME PATIENT: ABNORMAL H
FENTANYL UR QL SCN: NEGATIVE
GLOBULIN SER-MCNC: 3.5 G/DL (ref 2–4)
GLUCOSE BLDC GLUCOMTR-MCNC: 107 MG/DL (ref 70–99)
GLUCOSE SERPL-MCNC: 139 MG/DL (ref 70–99)
GLUCOSE UR STRIP-MCNC: NEGATIVE MG/DL
HCT VFR BLD CALC: 41.5 % (ref 39–51)
HGB BLD-MCNC: 13.8 G/DL (ref 13–17)
HGB UR QL STRIP: NEGATIVE
HYALINE CASTS #/AREA URNS LPF: ABNORMAL /LPF
IMM GRANULOCYTES # BLD AUTO: 0.1 K/MCL (ref 0–0.2)
IMM GRANULOCYTES # BLD: 0 %
INTERVENTRICULAR SEPTUM IN END DIASTOLE (IVSD): 0.83 CM
KETONES UR STRIP-MCNC: NEGATIVE MG/DL
LEFT INTERNAL DIMENSION IN SYSTOLE (LVSD): 2.77 CM
LEFT VENTRICULAR INTERNAL DIMENSION IN DIASTOLE (LVDD): 4.6 CM
LEFT VENTRICULAR POSTERIOR WALL IN END DIASTOLE (LVPW): 0.93 CM
LEUKOCYTE ESTERASE UR QL STRIP: NEGATIVE
LV EF: 66 %
LVOT 2D (LVOTD): 2.21 CM
LVOT VTI (LVOTVTI): 19.59 CM
LYMPHOCYTES # BLD: 1.6 K/MCL (ref 1–4.8)
LYMPHOCYTES NFR BLD: 11 %
MCH RBC QN AUTO: 26.6 PG (ref 26–34)
MCHC RBC AUTO-ENTMCNC: 33.3 G/DL (ref 32–36.5)
MCV RBC AUTO: 80.1 FL (ref 78–100)
MONOCYTES # BLD: 1.2 K/MCL (ref 0.3–0.9)
MONOCYTES NFR BLD: 8 %
MUCOUS THREADS URNS QL MICRO: PRESENT
MV E TISSUE VEL LAT (MELV): 0.1 M/S
MV E TISSUE VEL MED (MESV): 0.1 M/S
MV E WAVE VEL/E TISSUE VEL MED(MSR): 4.57 UNITLESS
MV PEAK A VELOCITY (MVPAV): 0.4 M/S
MV PEAK E VELOCITY (MVPEV): 0.6 M/S
NEUTROPHILS # BLD: 12.3 K/MCL (ref 1.8–7.7)
NEUTROPHILS NFR BLD: 80 %
NITRITE UR QL STRIP: NEGATIVE
NRBC BLD MANUAL-RTO: 0 /100 WBC
OPIATES UR QL SCN>300 NG/ML: NEGATIVE
PCP UR QL SCN>25 NG/ML: NEGATIVE
PH UR STRIP: 8 [PH] (ref 5–7)
PLATELET # BLD AUTO: 219 K/MCL (ref 140–450)
POTASSIUM SERPL-SCNC: 4.5 MMOL/L (ref 3.4–5.1)
PROT SERPL-MCNC: 7 G/DL (ref 6.4–8.2)
PROT UR STRIP-MCNC: 30 MG/DL
PV PEAK VELOCITY (PVPV): 0.9 M/S
RAINBOW EXTRA TUBES HOLD SPECIMEN: NORMAL
RBC # BLD: 5.18 MIL/MCL (ref 4.5–5.9)
RBC #/AREA URNS HPF: ABNORMAL /HPF
SODIUM SERPL-SCNC: 136 MMOL/L (ref 135–145)
SP GR UR STRIP: >1.03 (ref 1–1.03)
SQUAMOUS #/AREA URNS HPF: ABNORMAL /HPF
TRICUSPID ANNULAR PLANE SYSTOLIC EXCURSION (TAPSE): 2.57 CM
TRICUSPID VALVE ANNULAR PEAK VELOCITY (TVAPV): 0.1 M/S
TROPONIN I SERPL DL<=0.01 NG/ML-MCNC: 102 NG/L
TROPONIN I SERPL DL<=0.01 NG/ML-MCNC: 103 NG/L
TV ESTIMATED RIGHT ARTERIAL PRESSURE (RAP): 3 MMHG
TYPE AND SCREEN EXPIRATION DATE: NORMAL
UROBILINOGEN UR STRIP-MCNC: 0.2 MG/DL
WBC # BLD: 15.4 K/MCL (ref 4.2–11)
WBC #/AREA URNS HPF: ABNORMAL /HPF

## 2023-10-23 PROCEDURE — 10004451 HB PACU RECOVERY 1ST 30 MINUTES: Performed by: SURGERY

## 2023-10-23 PROCEDURE — A44970 ANES LAPAROSCOPY APPENDECTOMY: Performed by: ANESTHESIOLOGY

## 2023-10-23 PROCEDURE — 10002807 HB RX 258: Performed by: INTERNAL MEDICINE

## 2023-10-23 PROCEDURE — 10002805 HB CONTRAST AGENT: Performed by: STUDENT IN AN ORGANIZED HEALTH CARE EDUCATION/TRAINING PROGRAM

## 2023-10-23 PROCEDURE — 99254 IP/OBS CNSLTJ NEW/EST MOD 60: CPT | Performed by: SURGERY

## 2023-10-23 PROCEDURE — 80053 COMPREHEN METABOLIC PANEL: CPT | Performed by: INTERNAL MEDICINE

## 2023-10-23 PROCEDURE — 36415 COLL VENOUS BLD VENIPUNCTURE: CPT | Performed by: INTERNAL MEDICINE

## 2023-10-23 PROCEDURE — 10006391 HB COMPLEX PROCEDURE: Performed by: SURGERY

## 2023-10-23 PROCEDURE — 44970 LAPAROSCOPY APPENDECTOMY: CPT | Performed by: NURSE PRACTITIONER

## 2023-10-23 PROCEDURE — 10004651 HB RX, NO CHARGE ITEM: Performed by: INTERNAL MEDICINE

## 2023-10-23 PROCEDURE — 86850 RBC ANTIBODY SCREEN: CPT | Performed by: INTERNAL MEDICINE

## 2023-10-23 PROCEDURE — 88304 TISSUE EXAM BY PATHOLOGIST: CPT | Performed by: SURGERY

## 2023-10-23 PROCEDURE — 10002800 HB RX 250 W HCPCS: Performed by: SURGERY

## 2023-10-23 PROCEDURE — 10004316 HB COUNTER-PREP

## 2023-10-23 PROCEDURE — 10002801 HB RX 250 W/O HCPCS: Performed by: ANESTHESIOLOGY

## 2023-10-23 PROCEDURE — 10002016 HB COUNTER INCENTIVE SPIROMETRY

## 2023-10-23 PROCEDURE — 93306 TTE W/DOPPLER COMPLETE: CPT

## 2023-10-23 PROCEDURE — 84484 ASSAY OF TROPONIN QUANT: CPT | Performed by: STUDENT IN AN ORGANIZED HEALTH CARE EDUCATION/TRAINING PROGRAM

## 2023-10-23 PROCEDURE — 10004651 HB RX, NO CHARGE ITEM: Performed by: SURGERY

## 2023-10-23 PROCEDURE — 10002800 HB RX 250 W HCPCS: Performed by: ANESTHESIOLOGY

## 2023-10-23 PROCEDURE — 10002800 HB RX 250 W HCPCS: Performed by: INTERNAL MEDICINE

## 2023-10-23 PROCEDURE — 82962 GLUCOSE BLOOD TEST: CPT

## 2023-10-23 PROCEDURE — 10006023 HB SUPPLY 272: Performed by: SURGERY

## 2023-10-23 PROCEDURE — 10002807 HB RX 258: Performed by: ANESTHESIOLOGY

## 2023-10-23 PROCEDURE — 10002801 HB RX 250 W/O HCPCS: Performed by: SURGERY

## 2023-10-23 PROCEDURE — 10002117 HB ADDITIONAL SURGERY TIME/30 MIN: Performed by: SURGERY

## 2023-10-23 PROCEDURE — G0378 HOSPITAL OBSERVATION PER HR: HCPCS

## 2023-10-23 PROCEDURE — 10002358 HB ANESTH GENERAL 1ST 1/2 HR: Performed by: SURGERY

## 2023-10-23 PROCEDURE — 84484 ASSAY OF TROPONIN QUANT: CPT | Performed by: NURSE PRACTITIONER

## 2023-10-23 PROCEDURE — 10002359 HB ANESTH GENERAL ADD'L 1/2 HR: Performed by: SURGERY

## 2023-10-23 PROCEDURE — 93306 TTE W/DOPPLER COMPLETE: CPT | Performed by: INTERNAL MEDICINE

## 2023-10-23 PROCEDURE — 99239 HOSP IP/OBS DSCHRG MGMT >30: CPT | Performed by: STUDENT IN AN ORGANIZED HEALTH CARE EDUCATION/TRAINING PROGRAM

## 2023-10-23 PROCEDURE — 44970 LAPAROSCOPY APPENDECTOMY: CPT | Performed by: SURGERY

## 2023-10-23 PROCEDURE — 85025 COMPLETE CBC W/AUTO DIFF WBC: CPT | Performed by: INTERNAL MEDICINE

## 2023-10-23 PROCEDURE — 99223 1ST HOSP IP/OBS HIGH 75: CPT | Performed by: INTERNAL MEDICINE

## 2023-10-23 DEVICE — ETS45 RELOAD STANDARD 45MM
Type: IMPLANTABLE DEVICE | Site: ABDOMEN | Status: FUNCTIONAL
Brand: ENDOPATH

## 2023-10-23 RX ORDER — SODIUM CHLORIDE, SODIUM LACTATE, POTASSIUM CHLORIDE, CALCIUM CHLORIDE 600; 310; 30; 20 MG/100ML; MG/100ML; MG/100ML; MG/100ML
INJECTION, SOLUTION INTRAVENOUS CONTINUOUS PRN
Status: DISCONTINUED | OUTPATIENT
Start: 2023-10-23 | End: 2023-10-23

## 2023-10-23 RX ORDER — DEXAMETHASONE SODIUM PHOSPHATE 4 MG/ML
INJECTION, SOLUTION INTRA-ARTICULAR; INTRALESIONAL; INTRAMUSCULAR; INTRAVENOUS; SOFT TISSUE PRN
Status: DISCONTINUED | OUTPATIENT
Start: 2023-10-23 | End: 2023-10-23

## 2023-10-23 RX ORDER — MAGNESIUM HYDROXIDE 1200 MG/15ML
LIQUID ORAL PRN
Status: DISCONTINUED | OUTPATIENT
Start: 2023-10-23 | End: 2023-10-23 | Stop reason: HOSPADM

## 2023-10-23 RX ORDER — AMOXICILLIN 250 MG
2 CAPSULE ORAL 2 TIMES DAILY PRN
Status: DISCONTINUED | OUTPATIENT
Start: 2023-10-23 | End: 2023-10-23

## 2023-10-23 RX ORDER — BISACODYL 10 MG
10 SUPPOSITORY, RECTAL RECTAL DAILY PRN
Status: DISCONTINUED | OUTPATIENT
Start: 2023-10-23 | End: 2023-10-23

## 2023-10-23 RX ORDER — ONDANSETRON 4 MG/1
4 TABLET, ORALLY DISINTEGRATING ORAL
Status: ACTIVE | OUTPATIENT
Start: 2023-10-23 | End: 2023-10-23

## 2023-10-23 RX ORDER — KETOROLAC TROMETHAMINE 15 MG/ML
15 INJECTION, SOLUTION INTRAMUSCULAR; INTRAVENOUS EVERY 6 HOURS PRN
Status: DISCONTINUED | OUTPATIENT
Start: 2023-10-23 | End: 2023-10-23 | Stop reason: HOSPADM

## 2023-10-23 RX ORDER — MIDAZOLAM HYDROCHLORIDE 1 MG/ML
INJECTION, SOLUTION INTRAMUSCULAR; INTRAVENOUS PRN
Status: DISCONTINUED | OUTPATIENT
Start: 2023-10-23 | End: 2023-10-23

## 2023-10-23 RX ORDER — 0.9 % SODIUM CHLORIDE 0.9 %
2 VIAL (ML) INJECTION EVERY 12 HOURS SCHEDULED
Status: DISCONTINUED | OUTPATIENT
Start: 2023-10-23 | End: 2023-10-23 | Stop reason: HOSPADM

## 2023-10-23 RX ORDER — ACETAMINOPHEN 325 MG/1
650 TABLET ORAL EVERY 6 HOURS PRN
Status: DISCONTINUED | OUTPATIENT
Start: 2023-10-23 | End: 2023-10-23 | Stop reason: HOSPADM

## 2023-10-23 RX ORDER — DEXTROSE MONOHYDRATE, SODIUM CHLORIDE, SODIUM LACTATE, POTASSIUM CHLORIDE, CALCIUM CHLORIDE 5; 600; 310; 179; 20 G/100ML; MG/100ML; MG/100ML; MG/100ML; MG/100ML
INJECTION, SOLUTION INTRAVENOUS CONTINUOUS
Status: DISCONTINUED | OUTPATIENT
Start: 2023-10-23 | End: 2023-10-23

## 2023-10-23 RX ORDER — HYDROCODONE BITARTRATE AND ACETAMINOPHEN 5; 325 MG/1; MG/1
1 TABLET ORAL EVERY 4 HOURS PRN
Status: DISCONTINUED | OUTPATIENT
Start: 2023-10-23 | End: 2023-10-23 | Stop reason: HOSPADM

## 2023-10-23 RX ORDER — LIDOCAINE HYDROCHLORIDE 10 MG/ML
INJECTION, SOLUTION INFILTRATION; PERINEURAL PRN
Status: DISCONTINUED | OUTPATIENT
Start: 2023-10-23 | End: 2023-10-23

## 2023-10-23 RX ORDER — 0.9 % SODIUM CHLORIDE 0.9 %
VIAL (ML) INJECTION PRN
Status: DISCONTINUED | OUTPATIENT
Start: 2023-10-23 | End: 2023-10-23 | Stop reason: HOSPADM

## 2023-10-23 RX ORDER — PROPOFOL 10 MG/ML
INJECTION, EMULSION INTRAVENOUS PRN
Status: DISCONTINUED | OUTPATIENT
Start: 2023-10-23 | End: 2023-10-23

## 2023-10-23 RX ORDER — DROPERIDOL 2.5 MG/ML
0.62 INJECTION, SOLUTION INTRAMUSCULAR; INTRAVENOUS
Status: ACTIVE | OUTPATIENT
Start: 2023-10-23 | End: 2023-10-23

## 2023-10-23 RX ORDER — TRAMADOL HYDROCHLORIDE 50 MG/1
50 TABLET ORAL EVERY 8 HOURS PRN
Qty: 9 TABLET | Refills: 0 | Status: SHIPPED | OUTPATIENT
Start: 2023-10-23 | End: 2023-10-26

## 2023-10-23 RX ORDER — ROCURONIUM BROMIDE 10 MG/ML
INJECTION, SOLUTION INTRAVENOUS PRN
Status: DISCONTINUED | OUTPATIENT
Start: 2023-10-23 | End: 2023-10-23

## 2023-10-23 RX ADMIN — DEXTROSE MONOHYDRATE, SODIUM CHLORIDE, SODIUM LACTATE, POTASSIUM CHLORIDE, CALCIUM CHLORIDE: 5; 600; 310; 179; 20 INJECTION, SOLUTION INTRAVENOUS at 02:59

## 2023-10-23 RX ADMIN — LIDOCAINE HYDROCHLORIDE 60 MG: 10 INJECTION, SOLUTION INFILTRATION; PERINEURAL at 09:14

## 2023-10-23 RX ADMIN — PROPOFOL 200 MG: 10 INJECTION, EMULSION INTRAVENOUS at 09:14

## 2023-10-23 RX ADMIN — PERFLUTREN 1.5 ML: 6.52 INJECTION, SUSPENSION INTRAVENOUS at 11:56

## 2023-10-23 RX ADMIN — PIPERACILLIN SODIUM AND TAZOBACTAM SODIUM 4.5 G: 4; .5 INJECTION, POWDER, FOR SOLUTION INTRAVENOUS at 05:50

## 2023-10-23 RX ADMIN — SODIUM CHLORIDE, POTASSIUM CHLORIDE, SODIUM LACTATE AND CALCIUM CHLORIDE: 600; 310; 30; 20 INJECTION, SOLUTION INTRAVENOUS at 09:04

## 2023-10-23 RX ADMIN — SODIUM CHLORIDE, PRESERVATIVE FREE 2 ML: 5 INJECTION INTRAVENOUS at 07:37

## 2023-10-23 RX ADMIN — SUGAMMADEX 100 MG: 100 INJECTION, SOLUTION INTRAVENOUS at 09:47

## 2023-10-23 RX ADMIN — MIDAZOLAM HYDROCHLORIDE 2 MG: 1 INJECTION, SOLUTION INTRAMUSCULAR; INTRAVENOUS at 09:08

## 2023-10-23 RX ADMIN — DEXAMETHASONE SODIUM PHOSPHATE 10 MG: 4 INJECTION INTRA-ARTICULAR; INTRALESIONAL; INTRAMUSCULAR; INTRAVENOUS; SOFT TISSUE at 09:18

## 2023-10-23 RX ADMIN — ROCURONIUM BROMIDE 50 MG: 10 INJECTION INTRAVENOUS at 09:15

## 2023-10-23 RX ADMIN — KETOROLAC TROMETHAMINE 30 MG: 30 INJECTION, SOLUTION INTRAMUSCULAR at 09:36

## 2023-10-23 SDOH — ECONOMIC STABILITY: HOUSING INSECURITY: ARE YOU WORRIED ABOUT LOSING YOUR HOUSING?: NO

## 2023-10-23 SDOH — HEALTH STABILITY: PHYSICAL HEALTH: DO YOU HAVE SERIOUS DIFFICULTY WALKING OR CLIMBING STAIRS?: NO

## 2023-10-23 SDOH — ECONOMIC STABILITY: HOUSING INSECURITY: WHAT IS YOUR LIVING SITUATION TODAY?: HOUSE

## 2023-10-23 SDOH — HEALTH STABILITY: PHYSICAL HEALTH: DO YOU HAVE DIFFICULTY DRESSING OR BATHING?: NO

## 2023-10-23 SDOH — SOCIAL STABILITY: SOCIAL NETWORK
HOW OFTEN DO YOU SEE OR TALK TO PEOPLE THAT YOU CARE ABOUT AND FEEL CLOSE TO? (FOR EXAMPLE: TALKING TO FRIENDS ON THE PHONE, VISITING FRIENDS OR FAMILY, GOING TO CHURCH OR CLUB MEETINGS): 5 OR MORE TIMES A WEEK

## 2023-10-23 SDOH — HEALTH STABILITY: GENERAL: BECAUSE OF A PHYSICAL, MENTAL, OR EMOTIONAL CONDITION, DO YOU HAVE DIFFICULTY DOING ERRANDS ALONE?: NO

## 2023-10-23 SDOH — ECONOMIC STABILITY: TRANSPORTATION INSECURITY
IN THE PAST 12 MONTHS, HAS THE LACK OF TRANSPORTATION KEPT YOU FROM MEDICAL APPOINTMENTS OR FROM GETTING MEDICATIONS?: NO

## 2023-10-23 SDOH — SOCIAL STABILITY: SOCIAL NETWORK: SUPPORT SYSTEMS: SIGNIFICANT OTHER

## 2023-10-23 SDOH — ECONOMIC STABILITY: FOOD INSECURITY: HOW OFTEN IN THE PAST 12 MONTHS WERE YOU WORRIED OR STRESSED ABOUT HAVING ENOUGH MONEY TO BUY NUTRITIOUS MEALS?: RARELY

## 2023-10-23 SDOH — ECONOMIC STABILITY: TRANSPORTATION INSECURITY
IN THE PAST 12 MONTHS, HAS LACK OF TRANSPORTATION KEPT YOU FROM MEETINGS, WORK, OR FROM GETTING THINGS NEEDED FOR DAILY LIVING?: NO

## 2023-10-23 SDOH — ECONOMIC STABILITY: HOUSING INSECURITY: WHAT IS YOUR LIVING SITUATION TODAY?: SIGNIFICANT OTHER

## 2023-10-23 SDOH — HEALTH STABILITY: GENERAL
BECAUSE OF A PHYSICAL, MENTAL, OR EMOTIONAL CONDITION, DO YOU HAVE SERIOUS DIFFICULTY CONCENTRATING, REMEMBERING OR MAKING DECISIONS?: NO

## 2023-10-23 SDOH — ECONOMIC STABILITY: GENERAL

## 2023-10-23 ASSESSMENT — PATIENT HEALTH QUESTIONNAIRE - PHQ9
CLINICAL INTERPRETATION OF PHQ2 SCORE: NO FURTHER SCREENING NEEDED
SUM OF ALL RESPONSES TO PHQ9 QUESTIONS 1 AND 2: 0
2. FEELING DOWN, DEPRESSED OR HOPELESS: NOT AT ALL
IS PATIENT ABLE TO COMPLETE PHQ2 OR PHQ9: YES
SUM OF ALL RESPONSES TO PHQ9 QUESTIONS 1 AND 2: 0
1. LITTLE INTEREST OR PLEASURE IN DOING THINGS: NOT AT ALL

## 2023-10-23 ASSESSMENT — PAIN SCALES - GENERAL
PAINLEVEL_OUTOF10: 0

## 2023-10-23 ASSESSMENT — LIFESTYLE VARIABLES
ALCOHOL_USE_STATUS: NO OR LOW RISK WITH VALIDATED TOOL
AUDIT-C TOTAL SCORE: 1
HOW OFTEN DO YOU HAVE A DRINK CONTAINING ALCOHOL: MONTHLY OR LESS
HOW MANY STANDARD DRINKS CONTAINING ALCOHOL DO YOU HAVE ON A TYPICAL DAY: 0,1 OR 2
HOW OFTEN DO YOU HAVE 6 OR MORE DRINKS ON ONE OCCASION: NEVER

## 2023-10-23 ASSESSMENT — ACTIVITIES OF DAILY LIVING (ADL)
ADL_SHORT_OF_BREATH: NO
ADL_SCORE: 12
RECENT_DECLINE_ADL: NO
ADL_BEFORE_ADMISSION: INDEPENDENT

## 2023-10-23 ASSESSMENT — COLUMBIA-SUICIDE SEVERITY RATING SCALE - C-SSRS
6. HAVE YOU EVER DONE ANYTHING, STARTED TO DO ANYTHING, OR PREPARED TO DO ANYTHING TO END YOUR LIFE?: NO
IS THE PATIENT ABLE TO COMPLETE C-SSRS: YES
1. WITHIN THE PAST MONTH, HAVE YOU WISHED YOU WERE DEAD OR WISHED YOU COULD GO TO SLEEP AND NOT WAKE UP?: NO
2. HAVE YOU ACTUALLY HAD ANY THOUGHTS OF KILLING YOURSELF?: NO

## 2023-10-23 ASSESSMENT — ENCOUNTER SYMPTOMS: FEVER: 0

## 2023-10-24 ENCOUNTER — TELEPHONE (OUTPATIENT)
Dept: SURGERY | Age: 33
End: 2023-10-24

## 2023-10-24 LAB
ATRIAL RATE (BPM): 69
ATRIAL RATE (BPM): 72
DIASTOLIC BLOOD PRESSURE: 63
DIASTOLIC BLOOD PRESSURE: 98
P AXIS (DEGREES): 79
P AXIS (DEGREES): 79
PR-INTERVAL (MSEC): 164
PR-INTERVAL (MSEC): 180
QRS-INTERVAL (MSEC): 92
QRS-INTERVAL (MSEC): 98
QT-INTERVAL (MSEC): 372
QT-INTERVAL (MSEC): 396
QTC: 398
QTC: 433
R AXIS (DEGREES): 86
R AXIS (DEGREES): 87
REPORT TEXT: NORMAL
REPORT TEXT: NORMAL
SYSTOLIC BLOOD PRESSURE: 136
SYSTOLIC BLOOD PRESSURE: 147
T AXIS (DEGREES): 69
T AXIS (DEGREES): 71
VENTRICULAR RATE EKG/MIN (BPM): 69
VENTRICULAR RATE EKG/MIN (BPM): 72

## 2023-10-30 LAB
ASR DISCLAIMER: NORMAL
CASE RPRT: NORMAL
CLINICAL INFO: NORMAL
PATH REPORT.FINAL DX SPEC: NORMAL
PATH REPORT.GROSS SPEC: NORMAL
PATH REPORT.MICROSCOPIC SPEC OTHER STN: NORMAL

## 2023-11-09 ENCOUNTER — OFFICE VISIT (OUTPATIENT)
Dept: SURGERY | Age: 33
End: 2023-11-09

## 2023-11-09 DIAGNOSIS — Z90.49 STATUS POST APPENDECTOMY: Primary | ICD-10-CM

## 2023-11-09 PROCEDURE — 99024 POSTOP FOLLOW-UP VISIT: CPT | Performed by: SURGERY

## 2023-11-09 RX ORDER — POLYETHYLENE GLYCOL 3350 17 G/17G
17 POWDER, FOR SOLUTION ORAL DAILY
COMMUNITY

## 2025-10-06 ENCOUNTER — APPOINTMENT (OUTPATIENT)
Dept: FAMILY MEDICINE | Age: 35
End: 2025-10-06

## (undated) DEVICE — 2% CHLORHEXIDINE SKIN PREP ORANGE 26ML

## (undated) DEVICE — STRAP PSTN 60X3IN DEVON KN VELCRO 2023 LF DISP

## (undated) DEVICE — TOWEL SURG 27X17IN BLUE CTN PREWASH DELINT HI ABS STRL LF

## (undated) DEVICE — TROCAR LAPSCP 100MM 12MM EPTH XCEL SMTH SLV BLUNT TIP OBT

## (undated) DEVICE — COVER FLXB SEMIRIGID STRL LF DEVON LITEGLOVE LIGHT HNDL

## (undated) DEVICE — SHEARS ESURG 31CM 5MM ENDO MINI-SHR SS MNPLR CAUT ATRM SRR

## (undated) DEVICE — DEVICE SUT 10MM TROCAR SITE SPRG LOAD BLUNT STY ENDO CLOSE

## (undated) DEVICE — SLEEVE LAPSCP 5MM 100MM UNV STAB CANNULA RADOPQ TROCAR EPTH

## (undated) DEVICE — BAG SPEC RTRVL 6X4IN 10MM EPCH LG PLASTIC 224ML STRL LF DISP

## (undated) DEVICE — CUTTER ENDO ARTICULATE LNR 8 FRNG EPTH ETS 45MM 34CM DISP

## (undated) DEVICE — Device

## (undated) DEVICE — TUBING INSFL PNEUMOCLEAR SET HEATED HFL

## (undated) DEVICE — GLOVE SURG 7.5 PROTEXIS LF BLUE PF SMTH BEAD CUFF INTLK STRL

## (undated) DEVICE — SHEARS SURG 36CM HARMONIC CRV TIP US LAPSCP

## (undated) DEVICE — SYSTEM IMG CLEARIFY 8X6IN WARM HUB TROCAR WIPE MRFBR DISP

## (undated) DEVICE — TROCAR LAPSCP 100MM 5MM EPTH XCEL STAB SLV BLDLS OBT OPTC

## (undated) DEVICE — ARMBOARD PSTN 20X8X2IN DEVON FOAM

## (undated) DEVICE — SYRINGE 10ML STRL MED LF DISP LL

## (undated) DEVICE — SUTURE 0PDS 27INCH CT1 ANTI BACTERIAL

## (undated) DEVICE — GOWN SURG XL XLONG L4 RAGLAN SLV BRTHBL A LN STRL LF DISP

## (undated) DEVICE — SPONGE GAUZE 2X2IN CTN 8 PLY WOVEN FOLD EDGE XRAY DTCT STRL

## (undated) DEVICE — SOLUTION ANTIFOG .212OZ FOAM PAD RADOPQ ADH BACK NTOX

## (undated) DEVICE — SYRINGE 10ML GRAD N-PYRG DEHP-FR PVC FREE STRL MED LF DISP

## (undated) DEVICE — STRIP 4X.5IN SUTSTRP + POLYAMIDE SKNCLS LF

## (undated) DEVICE — ELECTRODE ESURG BLADE PNCL 10FT BTN SWH HEX LOCK CORD HLSTR

## (undated) DEVICE — IRRIGATOR SCT STRKFL 2 TIP STRL LF DISP

## (undated) DEVICE — NEEDLE HPO 22GA 1.5IN REG WALL REG BVL LL HUB DEHP-FR STRL

## (undated) DEVICE — GLOVE SURG 7 PROTEXIS PI MIC LF CRM PF SMTH BEAD CUFF STRL

## (undated) DEVICE — ADHESIVE LIQUID LF WTPRF VIAL PREP NONSTAIN MASTISOL STYRAX